# Patient Record
Sex: FEMALE | Race: BLACK OR AFRICAN AMERICAN | NOT HISPANIC OR LATINO | URBAN - METROPOLITAN AREA
[De-identification: names, ages, dates, MRNs, and addresses within clinical notes are randomized per-mention and may not be internally consistent; named-entity substitution may affect disease eponyms.]

---

## 2017-05-31 ENCOUNTER — LAB VISIT (OUTPATIENT)
Dept: LAB | Facility: HOSPITAL | Age: 37
End: 2017-05-31
Attending: FAMILY MEDICINE
Payer: COMMERCIAL

## 2017-05-31 ENCOUNTER — OFFICE VISIT (OUTPATIENT)
Dept: FAMILY MEDICINE | Facility: CLINIC | Age: 37
End: 2017-05-31
Attending: FAMILY MEDICINE
Payer: COMMERCIAL

## 2017-05-31 VITALS
BODY MASS INDEX: 23.19 KG/M2 | HEIGHT: 60 IN | DIASTOLIC BLOOD PRESSURE: 68 MMHG | RESPIRATION RATE: 16 BRPM | WEIGHT: 118.13 LBS | SYSTOLIC BLOOD PRESSURE: 110 MMHG

## 2017-05-31 DIAGNOSIS — K21.9 GASTROESOPHAGEAL REFLUX DISEASE, ESOPHAGITIS PRESENCE NOT SPECIFIED: ICD-10-CM

## 2017-05-31 DIAGNOSIS — Z00.00 ANNUAL PHYSICAL EXAM: Primary | ICD-10-CM

## 2017-05-31 DIAGNOSIS — Z00.00 ANNUAL PHYSICAL EXAM: ICD-10-CM

## 2017-05-31 LAB
ALBUMIN SERPL BCP-MCNC: 3.6 G/DL
ALP SERPL-CCNC: 71 U/L
ALT SERPL W/O P-5'-P-CCNC: 7 U/L
ANION GAP SERPL CALC-SCNC: 6 MMOL/L
AST SERPL-CCNC: 16 U/L
BASOPHILS # BLD AUTO: 0.01 K/UL
BASOPHILS NFR BLD: 0.2 %
BILIRUB SERPL-MCNC: 0.2 MG/DL
BILIRUB UR QL STRIP: NEGATIVE
BUN SERPL-MCNC: 14 MG/DL
CALCIUM SERPL-MCNC: 9.1 MG/DL
CHLORIDE SERPL-SCNC: 104 MMOL/L
CLARITY UR REFRACT.AUTO: ABNORMAL
CO2 SERPL-SCNC: 29 MMOL/L
COLOR UR AUTO: YELLOW
CREAT SERPL-MCNC: 1.1 MG/DL
DIFFERENTIAL METHOD: NORMAL
EOSINOPHIL # BLD AUTO: 0.1 K/UL
EOSINOPHIL NFR BLD: 2.3 %
ERYTHROCYTE [DISTWIDTH] IN BLOOD BY AUTOMATED COUNT: 13.1 %
EST. GFR  (AFRICAN AMERICAN): >60 ML/MIN/1.73 M^2
EST. GFR  (NON AFRICAN AMERICAN): >60 ML/MIN/1.73 M^2
GLUCOSE SERPL-MCNC: 93 MG/DL
GLUCOSE UR QL STRIP: NEGATIVE
HCT VFR BLD AUTO: 39.5 %
HGB BLD-MCNC: 13.4 G/DL
HGB UR QL STRIP: NEGATIVE
KETONES UR QL STRIP: ABNORMAL
LEUKOCYTE ESTERASE UR QL STRIP: NEGATIVE
LYMPHOCYTES # BLD AUTO: 1.5 K/UL
LYMPHOCYTES NFR BLD: 25.4 %
MCH RBC QN AUTO: 30 PG
MCHC RBC AUTO-ENTMCNC: 33.9 %
MCV RBC AUTO: 89 FL
MONOCYTES # BLD AUTO: 0.4 K/UL
MONOCYTES NFR BLD: 6.8 %
NEUTROPHILS # BLD AUTO: 3.7 K/UL
NEUTROPHILS NFR BLD: 65.1 %
NITRITE UR QL STRIP: NEGATIVE
PH UR STRIP: 5 [PH] (ref 5–8)
PLATELET # BLD AUTO: 327 K/UL
PMV BLD AUTO: 10.1 FL
POTASSIUM SERPL-SCNC: 4.5 MMOL/L
PROT SERPL-MCNC: 7.7 G/DL
PROT UR QL STRIP: NEGATIVE
RBC # BLD AUTO: 4.46 M/UL
SODIUM SERPL-SCNC: 139 MMOL/L
SP GR UR STRIP: >=1.03 (ref 1–1.03)
T4 FREE SERPL-MCNC: 0.92 NG/DL
TSH SERPL DL<=0.005 MIU/L-ACNC: 0.38 UIU/ML
URN SPEC COLLECT METH UR: ABNORMAL
UROBILINOGEN UR STRIP-ACNC: 2 EU/DL
WBC # BLD AUTO: 5.7 K/UL

## 2017-05-31 PROCEDURE — 84443 ASSAY THYROID STIM HORMONE: CPT

## 2017-05-31 PROCEDURE — 84439 ASSAY OF FREE THYROXINE: CPT

## 2017-05-31 PROCEDURE — 81003 URINALYSIS AUTO W/O SCOPE: CPT

## 2017-05-31 PROCEDURE — 99385 PREV VISIT NEW AGE 18-39: CPT | Mod: S$GLB,,, | Performed by: FAMILY MEDICINE

## 2017-05-31 PROCEDURE — 85025 COMPLETE CBC W/AUTO DIFF WBC: CPT

## 2017-05-31 PROCEDURE — 80053 COMPREHEN METABOLIC PANEL: CPT

## 2017-05-31 PROCEDURE — 99999 PR PBB SHADOW E&M-NEW PATIENT-LVL II: CPT | Mod: PBBFAC,,, | Performed by: FAMILY MEDICINE

## 2017-05-31 PROCEDURE — 36415 COLL VENOUS BLD VENIPUNCTURE: CPT | Mod: PO

## 2017-05-31 RX ORDER — OMEPRAZOLE 40 MG/1
40 CAPSULE, DELAYED RELEASE ORAL DAILY
Qty: 30 CAPSULE | Refills: 0 | COMMUNITY
Start: 2017-05-31 | End: 2018-04-04 | Stop reason: SDUPTHER

## 2017-05-31 NOTE — PATIENT INSTRUCTIONS
Your test results will be communicated to you via : [facility], Telephone or Letter.   If you have not received your test results in one week, please contact the clinic at [phone].

## 2017-06-07 NOTE — PROGRESS NOTES
Subjective:       Patient ID: Zoraida Mendoza is a 36 y.o. female.    Chief Complaint: Annual Exam    HPI   Pt is here for annual exam pt is well no sob/cp pt has gerd with intermittent heart burn epigastric pan pt takes nothing on a schedule for this   Review of Systems   Constitutional: Negative for activity change, chills, diaphoresis, fatigue and fever.   HENT: Negative for congestion, ear discharge, ear pain, hearing loss, postnasal drip, rhinorrhea, sinus pressure, sneezing, sore throat, trouble swallowing and voice change.    Eyes: Negative for photophobia, discharge, redness, itching and visual disturbance.   Respiratory: Negative for cough, chest tightness, shortness of breath and wheezing.    Cardiovascular: Negative for chest pain, palpitations and leg swelling.   Gastrointestinal: Negative for abdominal pain, anal bleeding, blood in stool, constipation, diarrhea, nausea, rectal pain and vomiting.   Genitourinary: Negative for dyspareunia, dysuria, flank pain, frequency, hematuria, menstrual problem, pelvic pain, urgency, vaginal bleeding and vaginal discharge.   Musculoskeletal: Negative for arthralgias, back pain, joint swelling and neck pain.   Skin: Negative for color change and rash.   Neurological: Negative for dizziness, speech difficulty, weakness, light-headedness, numbness and headaches.   Hematological: Does not bruise/bleed easily.   Psychiatric/Behavioral: Negative for agitation, confusion, decreased concentration, sleep disturbance and suicidal ideas. The patient is not nervous/anxious.        Objective:      Physical Exam   Constitutional: She appears well-developed and well-nourished.   HENT:   Head: Normocephalic and atraumatic.   Right Ear: External ear normal.   Left Ear: External ear normal.   Nose: Nose normal.   Mouth/Throat: Oropharynx is clear and moist.   Eyes: Conjunctivae and EOM are normal. Pupils are equal, round, and reactive to light. Right eye exhibits no discharge. Left  "eye exhibits no discharge.   Neck: Normal range of motion. Neck supple. No thyromegaly present.   Cardiovascular: Normal rate, regular rhythm, normal heart sounds and intact distal pulses.  Exam reveals no gallop and no friction rub.    No murmur heard.  Pulmonary/Chest: Effort normal and breath sounds normal. She has no wheezes. She has no rales.   Abdominal: Soft. Bowel sounds are normal. She exhibits no distension. There is no tenderness. There is no rebound and no guarding.   Genitourinary:   Genitourinary Comments: declines   Musculoskeletal: Normal range of motion. She exhibits no edema or tenderness.   Lymphadenopathy:     She has no cervical adenopathy.   Neurological: She is alert. No cranial nerve deficit. She exhibits normal muscle tone. Coordination normal.   Skin: Skin is warm and dry. No rash noted. No erythema.   Psychiatric: She has a normal mood and affect. Her behavior is normal. Judgment and thought content normal.       Assessment:       1. Annual physical exam    2. Gastroesophageal reflux disease, esophagitis presence not specified        Plan:     orders cbc cmp lipid tsh urine  Start ppi daily  Avoid late meals and triggers    rtc 1 month reevaluation    Health maintenance  Pap with gyn  Breast exam with pap  Lipid ordered  Flu shot in fall  Tetanus q 10 years         "This note will not be shared with the patient."   "

## 2018-03-13 ENCOUNTER — OFFICE VISIT (OUTPATIENT)
Dept: URGENT CARE | Facility: CLINIC | Age: 38
End: 2018-03-13
Payer: COMMERCIAL

## 2018-03-13 VITALS
WEIGHT: 118 LBS | HEIGHT: 60 IN | OXYGEN SATURATION: 100 % | BODY MASS INDEX: 23.16 KG/M2 | RESPIRATION RATE: 19 BRPM | HEART RATE: 80 BPM | DIASTOLIC BLOOD PRESSURE: 85 MMHG | SYSTOLIC BLOOD PRESSURE: 145 MMHG | TEMPERATURE: 99 F

## 2018-03-13 DIAGNOSIS — J02.9 SORE THROAT: ICD-10-CM

## 2018-03-13 DIAGNOSIS — J20.9 ACUTE BRONCHITIS, UNSPECIFIED ORGANISM: Primary | ICD-10-CM

## 2018-03-13 LAB
CTP QC/QA: YES
CTP QC/QA: YES
FLUAV AG NPH QL: NEGATIVE
FLUBV AG NPH QL: NEGATIVE
S PYO RRNA THROAT QL PROBE: NEGATIVE

## 2018-03-13 PROCEDURE — 87804 INFLUENZA ASSAY W/OPTIC: CPT | Mod: QW,S$GLB,, | Performed by: FAMILY MEDICINE

## 2018-03-13 PROCEDURE — 99214 OFFICE O/P EST MOD 30 MIN: CPT | Mod: S$GLB,,, | Performed by: FAMILY MEDICINE

## 2018-03-13 PROCEDURE — 87880 STREP A ASSAY W/OPTIC: CPT | Mod: QW,S$GLB,, | Performed by: FAMILY MEDICINE

## 2018-03-13 RX ORDER — BENZONATATE 100 MG/1
100 CAPSULE ORAL EVERY 6 HOURS PRN
Qty: 30 CAPSULE | Refills: 1 | Status: SHIPPED | OUTPATIENT
Start: 2018-03-13 | End: 2018-04-10

## 2018-03-13 RX ORDER — AZITHROMYCIN 250 MG/1
TABLET, FILM COATED ORAL
Qty: 6 TABLET | Refills: 0 | Status: SHIPPED | OUTPATIENT
Start: 2018-03-13 | End: 2018-03-18

## 2018-03-13 NOTE — PATIENT INSTRUCTIONS
Acute Bronchitis  Your healthcare provider has told you that you have acute bronchitis. Bronchitis is infection or inflammation of the bronchial tubes (airways in the lungs). Normally, air moves easily in and out of the airways. Bronchitis narrows the airways, making it harder for air to flow in and out of the lungs. This causes symptoms such as shortness of breath, coughing up yellow or green mucus, and wheezing. Bronchitis can be acute or chronic. Acute means the condition comes on quickly and goes away in a short time, usually within 3 to 10 days. Chronic means a condition lasts a long time and often comes back.    What causes acute bronchitis?  Acute bronchitis almost always starts as a viral respiratory infection, such as a cold or the flu. Certain factors make it more likely for a cold or flu to turn into bronchitis. These include being very young, being elderly, having a heart or lung problem, or having a weak immune system. Cigarette smoking also makes bronchitis more likely.  When bronchitis develops, the airways become swollen. The airways may also become infected with bacteria. This is known as a secondary infection.  Diagnosing acute bronchitis  Your healthcare provider will examine you and ask about your symptoms and health history. You may also have a sputum culture to test the fluid in your lungs. Chest X-rays may be done to look for infection in the lungs.  Treating acute bronchitis  Bronchitis usually clears up as the cold or flu goes away. You can help feel better faster by doing the following:  · Take medicine as directed. You may be told to take ibuprofen or other over-the-counter medicines. These help relieve inflammation in your bronchial tubes. Your healthcare provider may prescribe an inhaler to help open up the bronchial tubes. Most of the time, acute bronchitis is caused by a viral infection. Antibiotics are usually not prescribed for viral infections.  · Drink plenty of fluids, such as  water, juice, or warm soup. Fluids loosen mucus so that you can cough it up. This helps you breathe more easily. Fluids also prevent dehydration.  · Make sure you get plenty of rest.  · Do not smoke. Do not allow anyone else to smoke in your home.  Recovery and follow-up  Follow up with your doctor as you are told. You will likely feel better in a week or two. But a dry cough can linger beyond that time. Let your doctor know if you still have symptoms (other than a dry cough) after 2 weeks, or if youre prone to getting bronchial infections. Take steps to protect yourself from future infections. These steps include stopping smoking and avoiding tobacco smoke, washing your hands often, and getting a yearly flu shot.  When to call your healthcare provider  Call the healthcare provider if you have any of the following:  · Fever of 100.4°F (38.0°C) or higher, or as advised  · Symptoms that get worse, or new symptoms  · Trouble breathing  · Symptoms that dont start to improve within a week, or within 3 days of taking antibiotics   Date Last Reviewed: 12/1/2016  © 6033-2043 [employer]. [address] [place] [address], [place] [address]. All rights reserved. This information is not intended as a substitute for professional medical care. Always follow your healthcare professional's instructions.

## 2018-03-13 NOTE — PROGRESS NOTES
Subjective:       Patient ID: Zoraida Mendoza is a 37 y.o. female.    Vitals:  height is 5' (1.524 m) and weight is 53.5 kg (118 lb). Her oral temperature is 99 °F (37.2 °C). Her blood pressure is 145/85 (abnormal) and her pulse is 80. Her respiration is 19 and oxygen saturation is 100%.     Chief Complaint: Cough and Sore Throat    Cough   This is a new problem. The current episode started 1 to 4 weeks ago (2 weeks ago ). The problem has been unchanged. The problem occurs constantly. The cough is productive of sputum. Associated symptoms include chills, headaches, myalgias and a sore throat. Pertinent negatives include no chest pain, ear pain, eye redness, fever, shortness of breath or wheezing. Nothing aggravates the symptoms. She has tried OTC cough suppressant and prescription cough suppressant for the symptoms. The treatment provided no relief. Her past medical history is significant for asthma and bronchitis. There is no history of emphysema or pneumonia.   Sore Throat    This is a new problem. Episode onset: saturday. The problem has been unchanged. There has been no fever. The fever has been present for less than 1 day. The pain is at a severity of 5/10. The pain is moderate. Associated symptoms include congestion, coughing, headaches, a hoarse voice, a plugged ear sensation and trouble swallowing. Pertinent negatives include no abdominal pain, ear pain or shortness of breath. She has had no exposure to strep or mono. She has tried acetaminophen for the symptoms. The treatment provided no relief.     Review of Systems   Constitution: Positive for chills and malaise/fatigue. Negative for fever.   HENT: Positive for congestion, hoarse voice, sore throat and trouble swallowing. Negative for ear pain.    Eyes: Negative for discharge and redness.   Cardiovascular: Negative for chest pain, dyspnea on exertion and leg swelling.   Respiratory: Positive for cough and sputum production. Negative for shortness of  breath and wheezing.    Musculoskeletal: Positive for myalgias.   Gastrointestinal: Negative for abdominal pain and nausea.   Neurological: Positive for headaches.       Objective:      Physical Exam   Constitutional: She appears well-developed and well-nourished.   HENT:   Head: Normocephalic and atraumatic.   Mouth/Throat: Oropharynx is clear and moist. No oropharyngeal exudate.   Eyes: EOM are normal. Pupils are equal, round, and reactive to light.   Neck: Normal range of motion. Neck supple.   Cardiovascular: Normal rate, regular rhythm and normal heart sounds.    Pulmonary/Chest: Effort normal. She has wheezes (scant wheezes, + rhonchi).   Nursing note and vitals reviewed.      Assessment:       1. Acute bronchitis, unspecified organism    2. Sore throat        Plan:         Acute bronchitis, unspecified organism  -     POCT Influenza A/B  -     azithromycin (Z-NOVA) 250 MG tablet; Take 2 tablets by mouth on day 1; Take 1 tablet by mouth on days 2-5  Dispense: 6 tablet; Refill: 0  -     benzonatate (TESSALON PERLES) 100 MG capsule; Take 1 capsule (100 mg total) by mouth every 6 (six) hours as needed for Cough.  Dispense: 30 capsule; Refill: 1    Sore throat  -     POCT rapid strep A

## 2018-04-04 ENCOUNTER — OFFICE VISIT (OUTPATIENT)
Dept: URGENT CARE | Facility: CLINIC | Age: 38
End: 2018-04-04
Payer: COMMERCIAL

## 2018-04-04 VITALS
HEART RATE: 82 BPM | OXYGEN SATURATION: 98 % | HEIGHT: 60 IN | WEIGHT: 118 LBS | SYSTOLIC BLOOD PRESSURE: 132 MMHG | DIASTOLIC BLOOD PRESSURE: 96 MMHG | RESPIRATION RATE: 18 BRPM | TEMPERATURE: 99 F | BODY MASS INDEX: 23.16 KG/M2

## 2018-04-04 DIAGNOSIS — J20.9 ACUTE BRONCHITIS, UNSPECIFIED ORGANISM: Primary | ICD-10-CM

## 2018-04-04 DIAGNOSIS — Z98.84 HX OF LAPAROSCOPIC GASTRIC BANDING: ICD-10-CM

## 2018-04-04 DIAGNOSIS — K21.9 GASTROESOPHAGEAL REFLUX DISEASE, ESOPHAGITIS PRESENCE NOT SPECIFIED: ICD-10-CM

## 2018-04-04 PROCEDURE — 99214 OFFICE O/P EST MOD 30 MIN: CPT | Mod: S$GLB,,, | Performed by: NURSE PRACTITIONER

## 2018-04-04 RX ORDER — OMEPRAZOLE 40 MG/1
40 CAPSULE, DELAYED RELEASE ORAL DAILY
Qty: 30 CAPSULE | Refills: 1 | COMMUNITY
Start: 2018-04-04 | End: 2018-05-04

## 2018-04-04 RX ORDER — PROMETHAZINE HYDROCHLORIDE AND CODEINE PHOSPHATE 6.25; 1 MG/5ML; MG/5ML
5 SOLUTION ORAL NIGHTLY PRN
Qty: 120 ML | Refills: 0 | Status: SHIPPED | OUTPATIENT
Start: 2018-04-04 | End: 2018-04-14

## 2018-04-04 RX ORDER — PREDNISONE 20 MG/1
40 TABLET ORAL DAILY
Qty: 10 TABLET | Refills: 0 | Status: SHIPPED | OUTPATIENT
Start: 2018-04-04 | End: 2018-04-10

## 2018-04-04 RX ORDER — ALBUTEROL SULFATE 90 UG/1
1-2 AEROSOL, METERED RESPIRATORY (INHALATION) EVERY 6 HOURS PRN
Qty: 18 G | Refills: 0 | Status: SHIPPED | OUTPATIENT
Start: 2018-04-04 | End: 2019-04-04

## 2018-04-04 NOTE — PATIENT INSTRUCTIONS
Cough syrup may cause sedation.   Prilosec refilled.   Follow up with bariatric surgery. Referral has been placed. Call [phone] to schedule an appointment.     Please drink plenty of fluids.  Please get plenty of rest.  Please return here or go to the Emergency Department for any concerns or worsening of condition.  If you were prescribed antibiotics, please take them to completion.  If you were given wait & see antibiotics, please wait 5-7 days before taking them, and only take them if your symptoms have worsened or not improved.  If you do begin taking the antibiotics, please take them to completion.  If you were prescribed a narcotic medication, do not drive or operate heavy equipment or machinery while taking these medications.  If you were given a steroid shot in the clinic and have also been given a prescription for a steroid such as Prednisone or a Medrol Dose Pack, please begin taking them tomorrow.  If you do not have Hypertension or any history of palpitations, it is ok to take over the counter Sudafed or Mucinex D or Allegra-D or Claritin-D or Zyrtec-D.  If you do take one of the above, it is ok to combine that with plain over the counter Mucinex or Allegra or Claritin or Zyrtec.  If for example you are taking Zyrtec -D, you can combine that with Mucinex, but not Mucinex-D.  If you are taking Mucinex-D, you can combine that with plain Allegra or Claritin or Zyrtec.   If you do have Hypertension or palpitations, it is safe to take Coricidin HBP for relief of sinus symptoms.  If not allergic, please take over the counter Tylenol (Acetaminophen) and/or Motrin (Ibuprofen) as directed for control of pain and/or fever.  Please follow up with your primary care doctor or specialist as needed.    If you  smoke, please stop smoking.        Acute Bronchitis  Your healthcare provider has told you that you have acute bronchitis. Bronchitis is infection or inflammation of the bronchial tubes (airways in the  lungs). Normally, air moves easily in and out of the airways. Bronchitis narrows the airways, making it harder for air to flow in and out of the lungs. This causes symptoms such as shortness of breath, coughing up yellow or green mucus, and wheezing. Bronchitis can be acute or chronic. Acute means the condition comes on quickly and goes away in a short time, usually within 3 to 10 days. Chronic means a condition lasts a long time and often comes back.    What causes acute bronchitis?  Acute bronchitis almost always starts as a viral respiratory infection, such as a cold or the flu. Certain factors make it more likely for a cold or flu to turn into bronchitis. These include being very young, being elderly, having a heart or lung problem, or having a weak immune system. Cigarette smoking also makes bronchitis more likely.  When bronchitis develops, the airways become swollen. The airways may also become infected with bacteria. This is known as a secondary infection.  Diagnosing acute bronchitis  Your healthcare provider will examine you and ask about your symptoms and health history. You may also have a sputum culture to test the fluid in your lungs. Chest X-rays may be done to look for infection in the lungs.  Treating acute bronchitis  Bronchitis usually clears up as the cold or flu goes away. You can help feel better faster by doing the following:  · Take medicine as directed. You may be told to take ibuprofen or other over-the-counter medicines. These help relieve inflammation in your bronchial tubes. Your healthcare provider may prescribe an inhaler to help open up the bronchial tubes. Most of the time, acute bronchitis is caused by a viral infection. Antibiotics are usually not prescribed for viral infections.  · Drink plenty of fluids, such as water, juice, or warm soup. Fluids loosen mucus so that you can cough it up. This helps you breathe more easily. Fluids also prevent dehydration.  · Make sure you get  plenty of rest.  · Do not smoke. Do not allow anyone else to smoke in your home.  Recovery and follow-up  Follow up with your doctor as you are told. You will likely feel better in a week or two. But a dry cough can linger beyond that time. Let your doctor know if you still have symptoms (other than a dry cough) after 2 weeks, or if youre prone to getting bronchial infections. Take steps to protect yourself from future infections. These steps include stopping smoking and avoiding tobacco smoke, washing your hands often, and getting a yearly flu shot.  When to call your healthcare provider  Call the healthcare provider if you have any of the following:  · Fever of 100.4°F (38.0°C) or higher, or as advised  · Symptoms that get worse, or new symptoms  · Trouble breathing  · Symptoms that dont start to improve within a week, or within 3 days of taking antibiotics   Date Last Reviewed: 12/1/2016  © 0496-3068 [employer]. [address] [place] [address], [place] [address]. All rights reserved. This information is not intended as a substitute for professional medical care. Always follow your healthcare professional's instructions.        Tips to Control Acid Reflux    To control acid reflux, youll need to make some basic diet and lifestyle changes. The simple steps outlined below may be all youll need to ease discomfort.  Watch what you eat  · Avoid fatty foods and spicy foods.  · Eat fewer acidic foods, such as citrus and tomato-based foods. These can increase symptoms.  · Limit drinking alcohol, caffeine, and fizzy beverages. All increase acid reflux.  · Try limiting chocolate, peppermint, and spearmint. These can worsen acid reflux in some people.  Watch when you eat  · Avoid lying down for 3 hours after eating.  · Do not snack before going to bed.  Raise your head  Raising your head and upper body by 4 to 6 inches helps limit reflux when youre lying down. Put blocks under the head of your bed frame to raise  it.  Other changes  · Lose weight, if you need to  · Dont exercise near bedtime  · Avoid tight-fitting clothes  · Limit aspirin and ibuprofen  · Stop smoking   Date Last Reviewed: 7/1/2016  © 0141-9882 [employer]. [address] [place] [address], [place] [address]. All rights reserved. This information is not intended as a substitute for professional medical care. Always follow your healthcare professional's instructions.

## 2018-04-04 NOTE — PROGRESS NOTES
Subjective:       Patient ID: Zoraida Mendoza is a 37 y.o. female.    Vitals:  height is 5' (1.524 m) and weight is 53.5 kg (118 lb). Her oral temperature is 98.6 °F (37 °C). Her blood pressure is 132/96 (abnormal) and her pulse is 82. Her respiration is 18 and oxygen saturation is 98%.     Chief Complaint: Cough    She was seen in urgent care on 3/13/18- dx with bronchitis and given a z-nikko. She reports that she completed the z-nikko but continues to cough. + productive cough with green sputum. Denies SOB, CP, fever, or chills. Reports the cough is keeping her up all night. Sometimes the coughing spells will lead to vomiting.   She also suffers from GERD and has been out of her PPI.       Cough   This is a recurrent problem. The current episode started in the past 7 days. The problem has been gradually worsening. The problem occurs every few minutes. The cough is productive of sputum. Pertinent negatives include no chest pain, chills, ear pain, eye redness, fever, headaches, myalgias, sore throat, shortness of breath or wheezing. She has tried prescription cough suppressant for the symptoms. The treatment provided no relief.     Review of Systems   Constitution: Positive for malaise/fatigue. Negative for chills and fever.   HENT: Negative for congestion, ear pain, hoarse voice and sore throat.    Eyes: Negative for discharge and redness.   Cardiovascular: Negative for chest pain, dyspnea on exertion and leg swelling.   Respiratory: Positive for cough and sputum production. Negative for shortness of breath and wheezing.    Musculoskeletal: Negative for myalgias.   Gastrointestinal: Negative for abdominal pain and nausea.   Neurological: Negative for headaches.       Objective:      Physical Exam   Constitutional: She is oriented to person, place, and time. She appears well-developed and well-nourished. She is cooperative.  Non-toxic appearance. She does not appear ill. No distress.   Nonproductive cough during  exam.    HENT:   Head: Normocephalic and atraumatic.   Right Ear: Hearing, tympanic membrane, external ear and ear canal normal.   Left Ear: Hearing, tympanic membrane, external ear and ear canal normal.   Nose: Mucosal edema (boggy turbinates ) and rhinorrhea present. No nasal deformity. No epistaxis. Right sinus exhibits no maxillary sinus tenderness and no frontal sinus tenderness. Left sinus exhibits no maxillary sinus tenderness and no frontal sinus tenderness.   Mouth/Throat: Uvula is midline, oropharynx is clear and moist and mucous membranes are normal. No trismus in the jaw. Normal dentition. No uvula swelling. No oropharyngeal exudate, posterior oropharyngeal edema or posterior oropharyngeal erythema.   Eyes: Conjunctivae, EOM and lids are normal. Pupils are equal, round, and reactive to light. No scleral icterus.   Sclera clear bilat   Neck: Trachea normal, normal range of motion, full passive range of motion without pain and phonation normal. Neck supple.   Cardiovascular: Normal rate, regular rhythm, normal heart sounds, intact distal pulses and normal pulses.    Pulmonary/Chest: Effort normal. No accessory muscle usage. No tachypnea. No respiratory distress. She has decreased breath sounds in the right lower field. She has no wheezes. She has no rhonchi. She has no rales.   Abdominal: Soft. Normal appearance and bowel sounds are normal. She exhibits no distension. There is no tenderness.   Musculoskeletal: Normal range of motion. She exhibits no edema or deformity.   Neurological: She is alert and oriented to person, place, and time. She exhibits normal muscle tone. Coordination normal.   Skin: Skin is warm, dry and intact. She is not diaphoretic. No pallor.   Psychiatric: She has a normal mood and affect. Her speech is normal and behavior is normal. Judgment and thought content normal. Cognition and memory are normal.   Nursing note and vitals reviewed.        Reading Physician Reading Date Result  Priority   Janes Solis MD 4/4/2018    Narrative     EXAMINATION:  XR CHEST PA AND LATERAL    CLINICAL HISTORY:  Cough    TECHNIQUE:  PA and lateral views of the chest were performed.    COMPARISON:  None    FINDINGS:  Cardiac silhouette and pulmonary vascularity are within normal range.  Lungs show no evidence of significant focal consolidation, large effusion, or pneumothorax.  There is a gastric band present.  There is a distended tubular structure over the mediastinum which can be seen projected over posterior mediastinum on the lateral view.  Findings are concerning for a distended thoracic esophagus given the present of the gastric band.  Differential would include a large hiatal hernia if the band has slipped.  Bones demonstrate no acute abnormality.      Impression       Findings concerning for markedly distended esophagus in the setting of a gastric band differential would include a hiatal hernia if the band has slipped..  Correlate with patient's symptoms.  Further evaluation could be obtained with either chest CT or upper GI.       Electronically signed by: Janes Solis MD  Date: 04/04/2018  Time: 18:44       Assessment:       1. Acute bronchitis, unspecified organism    2. Gastroesophageal reflux disease, esophagitis presence not specified    3. Hx of laparoscopic gastric banding        Plan:         Acute bronchitis, unspecified organism  -     X-Ray Chest PA And Lateral; Future; Expected date: 04/04/2018  -     albuterol 90 mcg/actuation inhaler; Inhale 1-2 puffs into the lungs every 6 (six) hours as needed for Wheezing. Rescue  Dispense: 18 g; Refill: 0  -     promethazine-codeine 6.25-10 mg/5 ml (PHENERGAN WITH CODEINE) 6.25-10 mg/5 mL syrup; Take 5 mLs by mouth nightly as needed for Cough.  Dispense: 120 mL; Refill: 0  -     predniSONE (DELTASONE) 20 MG tablet; Take 2 tablets (40 mg total) by mouth once daily.  Dispense: 10 tablet; Refill: 0    Gastroesophageal reflux disease, esophagitis  presence not specified  -     omeprazole (PRILOSEC) 40 MG capsule; Take 1 capsule (40 mg total) by mouth once daily.  Dispense: 30 capsule; Refill: 1    Hx of laparoscopic gastric banding  -     Ambulatory Referral to Bariatric Surgery ([facility])       CXR negative for pneumonia.   + findings regarding lap band. patient reports a hx of lap band surgery in [place]. She lives here now. Referral placed to follow up with bariatric surgery. patient given a copy of her Xray reports and discussed the radiologist recommendations for Chest CT or Upper GI scope. She verbalized understanding regarding the importance of follow up.     Patient Instructions     Cough syrup may cause sedation.   Prilosec refilled.   Follow up with bariatric surgery. Referral has been placed. Call [phone] to schedule an appointment.     Please drink plenty of fluids.  Please get plenty of rest.  Please return here or go to the Emergency Department for any concerns or worsening of condition.  If you were prescribed antibiotics, please take them to completion.  If you were given wait & see antibiotics, please wait 5-7 days before taking them, and only take them if your symptoms have worsened or not improved.  If you do begin taking the antibiotics, please take them to completion.  If you were prescribed a narcotic medication, do not drive or operate heavy equipment or machinery while taking these medications.  If you were given a steroid shot in the clinic and have also been given a prescription for a steroid such as Prednisone or a Medrol Dose Pack, please begin taking them tomorrow.  If you do not have Hypertension or any history of palpitations, it is ok to take over the counter Sudafed or Mucinex D or Allegra-D or Claritin-D or Zyrtec-D.  If you do take one of the above, it is ok to combine that with plain over the counter Mucinex or Allegra or Claritin or Zyrtec.  If for example you are taking Zyrtec -D, you can combine that with Mucinex,  but not Mucinex-D.  If you are taking Mucinex-D, you can combine that with plain Allegra or Claritin or Zyrtec.   If you do have Hypertension or palpitations, it is safe to take Coricidin HBP for relief of sinus symptoms.  If not allergic, please take over the counter Tylenol (Acetaminophen) and/or Motrin (Ibuprofen) as directed for control of pain and/or fever.  Please follow up with your primary care doctor or specialist as needed.    If you  smoke, please stop smoking.        Acute Bronchitis  Your healthcare provider has told you that you have acute bronchitis. Bronchitis is infection or inflammation of the bronchial tubes (airways in the lungs). Normally, air moves easily in and out of the airways. Bronchitis narrows the airways, making it harder for air to flow in and out of the lungs. This causes symptoms such as shortness of breath, coughing up yellow or green mucus, and wheezing. Bronchitis can be acute or chronic. Acute means the condition comes on quickly and goes away in a short time, usually within 3 to 10 days. Chronic means a condition lasts a long time and often comes back.    What causes acute bronchitis?  Acute bronchitis almost always starts as a viral respiratory infection, such as a cold or the flu. Certain factors make it more likely for a cold or flu to turn into bronchitis. These include being very young, being elderly, having a heart or lung problem, or having a weak immune system. Cigarette smoking also makes bronchitis more likely.  When bronchitis develops, the airways become swollen. The airways may also become infected with bacteria. This is known as a secondary infection.  Diagnosing acute bronchitis  Your healthcare provider will examine you and ask about your symptoms and health history. You may also have a sputum culture to test the fluid in your lungs. Chest X-rays may be done to look for infection in the lungs.  Treating acute bronchitis  Bronchitis usually clears up as the cold  or flu goes away. You can help feel better faster by doing the following:  · Take medicine as directed. You may be told to take ibuprofen or other over-the-counter medicines. These help relieve inflammation in your bronchial tubes. Your healthcare provider may prescribe an inhaler to help open up the bronchial tubes. Most of the time, acute bronchitis is caused by a viral infection. Antibiotics are usually not prescribed for viral infections.  · Drink plenty of fluids, such as water, juice, or warm soup. Fluids loosen mucus so that you can cough it up. This helps you breathe more easily. Fluids also prevent dehydration.  · Make sure you get plenty of rest.  · Do not smoke. Do not allow anyone else to smoke in your home.  Recovery and follow-up  Follow up with your doctor as you are told. You will likely feel better in a week or two. But a dry cough can linger beyond that time. Let your doctor know if you still have symptoms (other than a dry cough) after 2 weeks, or if youre prone to getting bronchial infections. Take steps to protect yourself from future infections. These steps include stopping smoking and avoiding tobacco smoke, washing your hands often, and getting a yearly flu shot.  When to call your healthcare provider  Call the healthcare provider if you have any of the following:  · Fever of 100.4°F (38.0°C) or higher, or as advised  · Symptoms that get worse, or new symptoms  · Trouble breathing  · Symptoms that dont start to improve within a week, or within 3 days of taking antibiotics   Date Last Reviewed: 12/1/2016  © 7542-4831 [employer]. [address] [place] [address], [place] [address]. All rights reserved. This information is not intended as a substitute for professional medical care. Always follow your healthcare professional's instructions.        Tips to Control Acid Reflux    To control acid reflux, youll need to make some basic diet and lifestyle changes. The simple steps outlined  below may be all youll need to ease discomfort.  Watch what you eat  · Avoid fatty foods and spicy foods.  · Eat fewer acidic foods, such as citrus and tomato-based foods. These can increase symptoms.  · Limit drinking alcohol, caffeine, and fizzy beverages. All increase acid reflux.  · Try limiting chocolate, peppermint, and spearmint. These can worsen acid reflux in some people.  Watch when you eat  · Avoid lying down for 3 hours after eating.  · Do not snack before going to bed.  Raise your head  Raising your head and upper body by 4 to 6 inches helps limit reflux when youre lying down. Put blocks under the head of your bed frame to raise it.  Other changes  · Lose weight, if you need to  · Dont exercise near bedtime  · Avoid tight-fitting clothes  · Limit aspirin and ibuprofen  · Stop smoking   Date Last Reviewed: 7/1/2016  © 7280-6376 [employer], [employer]. [address] [place] [address], [place] [address]. All rights reserved. This information is not intended as a substitute for professional medical care. Always follow your healthcare professional's instructions.

## 2018-04-10 ENCOUNTER — OFFICE VISIT (OUTPATIENT)
Dept: OBSTETRICS AND GYNECOLOGY | Facility: CLINIC | Age: 38
End: 2018-04-10
Payer: COMMERCIAL

## 2018-04-10 VITALS
WEIGHT: 113.56 LBS | DIASTOLIC BLOOD PRESSURE: 66 MMHG | HEIGHT: 60 IN | SYSTOLIC BLOOD PRESSURE: 108 MMHG | BODY MASS INDEX: 22.29 KG/M2

## 2018-04-10 DIAGNOSIS — Z01.419 WELL WOMAN EXAM WITH ROUTINE GYNECOLOGICAL EXAM: ICD-10-CM

## 2018-04-10 DIAGNOSIS — Z12.4 ROUTINE PAPANICOLAOU SMEAR: Primary | ICD-10-CM

## 2018-04-10 PROCEDURE — 99999 PR PBB SHADOW E&M-EST. PATIENT-LVL II: CPT | Mod: PBBFAC,,, | Performed by: OBSTETRICS & GYNECOLOGY

## 2018-04-10 PROCEDURE — 99385 PREV VISIT NEW AGE 18-39: CPT | Mod: S$GLB,,, | Performed by: OBSTETRICS & GYNECOLOGY

## 2018-04-10 PROCEDURE — 88142 CYTOPATH C/V THIN LAYER: CPT

## 2018-04-10 NOTE — PROGRESS NOTES
HPI:  37 y.o.   OB History      Para Term  AB Living    0 0 0 0 0 0    SAB TAB Ectopic Multiple Live Births    0 0 0 0 0       No LMP recorded. Patient has had a hysterectomy.   Patient here for her annual gynecologic exam.  She has no complaints at this time.    ROS:  GENERAL: No fever, chills, fatigability or weight loss.  SKIN: No rashes, itching or changes in color or texture of skin.  HEAD: No headaches or recent head trauma.  EYES: Visual acuity fine. No photophobia, ocular pain or diplopia.  EARS: Denies ear pain, discharge or vertigo.  NOSE: No loss of smell, no epistaxis or postnasal drip.  MOUTH & THROAT: No hoarseness or change in voice. No excessive gum bleeding.  NODES: Denies swollen glands.  CHEST: Denies BOYD, cyanosis, wheezing, cough and sputum production.  CARDIOVASCULAR: Denies chest pain, PND, orthopnea or reduced exercise tolerance.  ABDOMEN: Appetite fine. No weight loss. Denies diarrhea, abdominal pain, hematemesis or blood in stool.  URINARY: No flank pain, dysuria or hematuria.  PERIPHERAL VASCULAR: No claudication or cyanosis.  MUSCULOSKELETAL: No joint stiffness or swelling. Denies back pain.  NEUROLOGIC: No history of seizures, paralysis, alteration of gait or coordination.    PE:   /66   Ht 5' (1.524 m)   Wt 51.5 kg (113 lb 8.6 oz)   BMI 22.17 kg/m²   APPEARANCE: Well nourished, well developed, in no acute distress.  NECK: Neck symmetric without masses or thyromegaly.  NODES: No inguinal lymph node enlargement.  ABDOMEN: Soft. No tenderness or masses. No hepatosplenomegaly. No hernias.  BREASTS: Symmetrical, no skin changes or visible lesions. No palpable masses, nipple discharge or adenopathy bilaterally.  PELVIC: Normal external female genitalia without lesions. Normal hair distribution. Adequate perineal body, normal urethral meatus. Vagina moist and well rugated without lesions or discharge. No significant cystocele or rectocele. Uterus and cervix surgically  absent. Bimanual exam revealed no masses, tenderness or abnormality.    Procedure:  Pap Smear    Assessment:  Normal Gynecologic Exam    Plan:  Mammogram and Colonoscopy as per current recommendations.   Return to clinic in one year or for any problems or complaints.  One ovary in  Neg gyn hx

## 2018-05-10 ENCOUNTER — OFFICE VISIT (OUTPATIENT)
Dept: URGENT CARE | Facility: CLINIC | Age: 38
End: 2018-05-10
Payer: COMMERCIAL

## 2018-05-10 VITALS
OXYGEN SATURATION: 100 % | HEART RATE: 76 BPM | BODY MASS INDEX: 22.19 KG/M2 | WEIGHT: 113 LBS | TEMPERATURE: 98 F | HEIGHT: 60 IN | SYSTOLIC BLOOD PRESSURE: 138 MMHG | DIASTOLIC BLOOD PRESSURE: 83 MMHG

## 2018-05-10 DIAGNOSIS — B00.2 ORAL HERPES: Primary | ICD-10-CM

## 2018-05-10 PROCEDURE — 3008F BODY MASS INDEX DOCD: CPT | Mod: CPTII,S$GLB,, | Performed by: NURSE PRACTITIONER

## 2018-05-10 PROCEDURE — 99214 OFFICE O/P EST MOD 30 MIN: CPT | Mod: S$GLB,,, | Performed by: NURSE PRACTITIONER

## 2018-05-10 RX ORDER — ACYCLOVIR 400 MG/1
400 TABLET ORAL 3 TIMES DAILY
Qty: 21 TABLET | Refills: 0 | Status: SHIPPED | OUTPATIENT
Start: 2018-05-10 | End: 2018-05-17

## 2018-05-10 NOTE — PATIENT INSTRUCTIONS
Understanding Cold Sores  Cold sores are small blisters or sores on the lip or sometimes inside the mouth. Many people get them from time to time. Cold sores usually are not serious, and they usually heal in a week or two. They are caused by 2 related viruses, herpes simplex type 1 and 2. These viruses spread very easily. Many people have one or both of these viruses in their body. More than 4 in every 5 people are infected with herpes simplex type 1. Once you have the virus that causes cold sores, it stays in your body for the rest of your life. But it can be inactive for long periods.  What causes a cold sore?  Cold sores are usually caused by herpes simplex virus type 1. Less often, they are caused by herpes simplex virus type 2. Herpes simplex virus type 2 is the more common cause of genital sores. The herpes viruses can enter the body through a break in the skin such as a scrape. Or they may enter through mucous membranes such as the lips or mouth. Some ways to get the viruses include:  · Kissing someone who has a cold sore  · Sharing a drinking glass, eating utensils, or lip balm with someone who has a cold sore  · Having oral sex with someone who has a cold sore  A  baby can also get the infection at birth.  If you have a herpes virus, you can to pass it along even when you dont have a sore.  Cold sores flare up occasionally. Things that can cause an outbreak include:  · Sun exposure  · Fever  · Stress or exhaustion  · Menstruation  · Skin irritation  · Another unrelated Illness such as pneumonia, urinary infection, or cancer  What are the symptoms of a cold sore?  Symptoms can include:  · A blister-like sore or cluster of sores. These often occur at the edge of the lips but may appear inside the mouth.  · Skin redness around the sores.  · Pain or itching in the area of the outbreak. Often the pain or itching develops 12 to 24 hours before the sore become visible.  · Flu-like symptoms, including  swollen glands, headache, body ache, or fever. These typically occur only at the time of the first infection.  Cold sores may also occur on fingers. They may rarely infect the eyes, a serious possible complication.  Some people have symptoms a day or two before an outbreak. They may feel tenderness, burning, itching, or tingling before a cold sore appears. Cold sores tend to come back in the same area that they first appeared.  How are cold sores treated?  Treatment for cold sores focuses on relieving and shortening symptoms. For people with frequent outbreaks, treatment works to decrease how often future episodes.  Treatments may include:  · Prescription or over-the-counter pain medicines. These can help with discomfort, especially if sores are inside the mouth.  · Antiviral medicines. These may be pills that are taken by mouth or a cream to apply to sores. They may help shorten an outbreak and reduce the severity of symptoms.  They may be used to help prevent future outbreaks if you have disabling recurrent infections.  · Self-care such as extra rest and drinking more fluids. These may help relieve the flu-like symptoms of a first outbreak.  How are cold sores diagnosed?  Your healthcare provider makes the diagnosis mainly by looking at the sores and doing a clinical exam.  This may be confirmed by swab tests or blood tests.  How can I prevent cold sores?  You can help reduce the spread of the herpes viruses that cause cold sores. This can help both you and others avoid getting cold sores. Follow these tips:  · Do not kiss others if you have a cold sore. Also avoid kissing someone with a cold sore.  · Do not share eating utensils, lip balm, razors, or towels with someone who has a cold sore.  · Wash your hands after touching the area of a cold sore. The herpes virus can be carried from your face to your hands when you touch the area of a cold sore. When this happens, wash your hands thoroughly, for at least 20  seconds. When you cant wash with soap and water, use an alcohol-based hand .  · Disinfect things you touch often, such as phones and keyboards.    · If you feel a cold sore coming on, do the same things you would do when a cold sore is present to avoid spreading the virus.  · Use condoms to help prevent passing on the viruses through sex.  What are the possible complications of a cold sore?  Cold sores usually go away by themselves within 2 weeks. For most people cold sores are not serious. The viruses that cause cold sores can cause more serious illness, though. People who have a weak immune system may get more serious infections from herpes viruses. These include people being treated for cancer or who have HIV disease. Babies may also become very ill from a herpes infection.      When should I call my healthcare provider?  Call your healthcare provider right away if you have any of these:  · Fever of 100.4°F (38°C) or higher, or as directed  · Pain that gets worse  · You cannot eat or drink because of painful sores  · Symptoms dont get better within 5 to 7 days  · Blisters spread beyond the mouth or lip to areas on the chest, arms, face, or legs   Date Last Reviewed: 3/28/2016  © 1839-7766 [employer]. [address] [place] [address], [place] [address]. All rights reserved. This information is not intended as a substitute for professional medical care. Always follow your healthcare professional's instructions.

## 2018-05-10 NOTE — PROGRESS NOTES
Subjective:       Patient ID: Zoraida Mendoza is a 37 y.o. female.    Vitals:  height is 5' (1.524 m) and weight is 51.3 kg (113 lb). Her oral temperature is 98.1 °F (36.7 °C). Her blood pressure is 138/83 and her pulse is 76. Her oxygen saturation is 100%.     Chief Complaint: Blister (mouth)    Patient presents with red lesions on both lips, was treated here one month prior for bronchitis. No known sick contacts.       Mouth Lesions    The current episode started 3 to 5 days ago. The onset was sudden. The problem occurs continuously. The problem has been rapidly worsening. The problem is moderate. Relieved by: abreva. Associated symptoms include mouth sores and rash. Pertinent negatives include no fever and no sore throat. She has been behaving normally. She has been eating and drinking normally. Urine output has been normal. The last void occurred less than 6 hours ago. There were no sick contacts. Recently, medical care has been given at this facility. Services received include medications given.     Review of Systems   Constitution: Negative for chills and fever.   HENT: Positive for mouth sores. Negative for sore throat.    Respiratory: Negative for shortness of breath.    Skin: Positive for flushing, itching, rash and suspicious lesions.        Blister on lips   Musculoskeletal: Negative for joint pain.       Objective:      Physical Exam   Constitutional: She is oriented to person, place, and time. She appears well-developed and well-nourished. She is cooperative.  Non-toxic appearance. She does not appear ill. No distress.   HENT:   Head: Normocephalic and atraumatic.   Right Ear: Hearing, tympanic membrane and ear canal normal.   Left Ear: Hearing, tympanic membrane and ear canal normal.   Nose: No mucosal edema, rhinorrhea or nasal deformity. No epistaxis. Right sinus exhibits no maxillary sinus tenderness and no frontal sinus tenderness. Left sinus exhibits no maxillary sinus tenderness and no frontal  sinus tenderness.   Mouth/Throat: Uvula is midline and mucous membranes are normal. No trismus in the jaw. Normal dentition. No uvula swelling. No posterior oropharyngeal erythema.       Eyes: Conjunctivae and lids are normal. Right eye exhibits no discharge. Left eye exhibits no discharge. No scleral icterus.   Sclera clear bilat   Neck: Trachea normal, normal range of motion, full passive range of motion without pain and phonation normal. Neck supple.   Cardiovascular: Normal rate, regular rhythm, normal heart sounds, intact distal pulses and normal pulses.    Pulmonary/Chest: Effort normal and breath sounds normal. No respiratory distress.   Abdominal: Soft. Normal appearance and bowel sounds are normal. She exhibits no distension, no pulsatile midline mass and no mass. There is no tenderness.   Musculoskeletal: Normal range of motion. She exhibits no edema or deformity.   Neurological: She is alert and oriented to person, place, and time. She exhibits normal muscle tone. Coordination normal.   Skin: Skin is warm, dry and intact. She is not diaphoretic. No pallor.   Psychiatric: She has a normal mood and affect. Her speech is normal and behavior is normal. Judgment and thought content normal. Cognition and memory are normal.   Nursing note and vitals reviewed.      Assessment:       1. Oral herpes        Plan:       Patient Instructions       Understanding Cold Sores  Cold sores are small blisters or sores on the lip or sometimes inside the mouth. Many people get them from time to time. Cold sores usually are not serious, and they usually heal in a week or two. They are caused by 2 related viruses, herpes simplex type 1 and 2. These viruses spread very easily. Many people have one or both of these viruses in their body. More than 4 in every 5 people are infected with herpes simplex type 1. Once you have the virus that causes cold sores, it stays in your body for the rest of your life. But it can be inactive for  long periods.  What causes a cold sore?  Cold sores are usually caused by herpes simplex virus type 1. Less often, they are caused by herpes simplex virus type 2. Herpes simplex virus type 2 is the more common cause of genital sores. The herpes viruses can enter the body through a break in the skin such as a scrape. Or they may enter through mucous membranes such as the lips or mouth. Some ways to get the viruses include:  · Kissing someone who has a cold sore  · Sharing a drinking glass, eating utensils, or lip balm with someone who has a cold sore  · Having oral sex with someone who has a cold sore  A  baby can also get the infection at birth.  If you have a herpes virus, you can to pass it along even when you dont have a sore.  Cold sores flare up occasionally. Things that can cause an outbreak include:  · Sun exposure  · Fever  · Stress or exhaustion  · Menstruation  · Skin irritation  · Another unrelated Illness such as pneumonia, urinary infection, or cancer  What are the symptoms of a cold sore?  Symptoms can include:  · A blister-like sore or cluster of sores. These often occur at the edge of the lips but may appear inside the mouth.  · Skin redness around the sores.  · Pain or itching in the area of the outbreak. Often the pain or itching develops 12 to 24 hours before the sore become visible.  · Flu-like symptoms, including swollen glands, headache, body ache, or fever. These typically occur only at the time of the first infection.  Cold sores may also occur on fingers. They may rarely infect the eyes, a serious possible complication.  Some people have symptoms a day or two before an outbreak. They may feel tenderness, burning, itching, or tingling before a cold sore appears. Cold sores tend to come back in the same area that they first appeared.  How are cold sores treated?  Treatment for cold sores focuses on relieving and shortening symptoms. For people with frequent outbreaks, treatment works  to decrease how often future episodes.  Treatments may include:  · Prescription or over-the-counter pain medicines. These can help with discomfort, especially if sores are inside the mouth.  · Antiviral medicines. These may be pills that are taken by mouth or a cream to apply to sores. They may help shorten an outbreak and reduce the severity of symptoms.  They may be used to help prevent future outbreaks if you have disabling recurrent infections.  · Self-care such as extra rest and drinking more fluids. These may help relieve the flu-like symptoms of a first outbreak.  How are cold sores diagnosed?  Your healthcare provider makes the diagnosis mainly by looking at the sores and doing a clinical exam.  This may be confirmed by swab tests or blood tests.  How can I prevent cold sores?  You can help reduce the spread of the herpes viruses that cause cold sores. This can help both you and others avoid getting cold sores. Follow these tips:  · Do not kiss others if you have a cold sore. Also avoid kissing someone with a cold sore.  · Do not share eating utensils, lip balm, razors, or towels with someone who has a cold sore.  · Wash your hands after touching the area of a cold sore. The herpes virus can be carried from your face to your hands when you touch the area of a cold sore. When this happens, wash your hands thoroughly, for at least 20 seconds. When you cant wash with soap and water, use an alcohol-based hand .  · Disinfect things you touch often, such as phones and keyboards.    · If you feel a cold sore coming on, do the same things you would do when a cold sore is present to avoid spreading the virus.  · Use condoms to help prevent passing on the viruses through sex.  What are the possible complications of a cold sore?  Cold sores usually go away by themselves within 2 weeks. For most people cold sores are not serious. The viruses that cause cold sores can cause more serious illness, though. People  who have a weak immune system may get more serious infections from herpes viruses. These include people being treated for cancer or who have HIV disease. Babies may also become very ill from a herpes infection.      When should I call my healthcare provider?  Call your healthcare provider right away if you have any of these:  · Fever of 100.4°F (38°C) or higher, or as directed  · Pain that gets worse  · You cannot eat or drink because of painful sores  · Symptoms dont get better within 5 to 7 days  · Blisters spread beyond the mouth or lip to areas on the chest, arms, face, or legs   Date Last Reviewed: 3/28/2016  © 1772-1473 [employer]. [address] [place] [address], [place] [address]. All rights reserved. This information is not intended as a substitute for professional medical care. Always follow your healthcare professional's instructions.          Oral herpes    Other orders  -     acyclovir (ZOVIRAX) 400 MG tablet; Take 1 tablet (400 mg total) by mouth 3 (three) times daily.  Dispense: 21 tablet; Refill: 0

## 2018-11-09 ENCOUNTER — PATIENT OUTREACH (OUTPATIENT)
Dept: ADMINISTRATIVE | Facility: HOSPITAL | Age: 38
End: 2018-11-09

## 2018-11-09 DIAGNOSIS — Z12.39 BREAST CANCER SCREENING: ICD-10-CM

## 2018-11-09 NOTE — PROGRESS NOTES
Dear Zoraida Mendoza,        Our records indicate that you are due for a mammogram.    In the [place], one in nine women will develop breast cancer during their lifetime. While there is no way to prevent breast cancer, early detection provides the best opportunity for curing it.    For women over the age of 40, the American Cancer Society recommends a yearly clinical breast exam and a yearly mammogram. These practices have saved thousands of lives. We need your help to ensure that you are receiving optimal medical care.    Please make an appointment for a mammogram at your earliest convenience. Please call [phone] to schedule. Thank you for choosing [facility] for your healthcare needs. Have a great day.    Sincerely,    BEBETO Monterroso  Clinical Care Coordinator  Internal Medicine  [facility]/[place]

## 2019-05-29 ENCOUNTER — OFFICE VISIT (OUTPATIENT)
Dept: URGENT CARE | Facility: CLINIC | Age: 39
End: 2019-05-29
Payer: COMMERCIAL

## 2019-05-29 VITALS
HEIGHT: 60 IN | BODY MASS INDEX: 29.45 KG/M2 | HEART RATE: 66 BPM | RESPIRATION RATE: 16 BRPM | OXYGEN SATURATION: 100 % | WEIGHT: 150 LBS | TEMPERATURE: 99 F | DIASTOLIC BLOOD PRESSURE: 100 MMHG | SYSTOLIC BLOOD PRESSURE: 151 MMHG

## 2019-05-29 DIAGNOSIS — R03.0 ELEVATED BP WITHOUT DIAGNOSIS OF HYPERTENSION: ICD-10-CM

## 2019-05-29 DIAGNOSIS — B00.2 ORAL HERPES: Primary | ICD-10-CM

## 2019-05-29 PROCEDURE — 3008F BODY MASS INDEX DOCD: CPT | Mod: CPTII,S$GLB,, | Performed by: NURSE PRACTITIONER

## 2019-05-29 PROCEDURE — 99214 PR OFFICE/OUTPT VISIT, EST, LEVL IV, 30-39 MIN: ICD-10-PCS | Mod: S$GLB,,, | Performed by: NURSE PRACTITIONER

## 2019-05-29 PROCEDURE — 99214 OFFICE O/P EST MOD 30 MIN: CPT | Mod: S$GLB,,, | Performed by: NURSE PRACTITIONER

## 2019-05-29 PROCEDURE — 3008F PR BODY MASS INDEX (BMI) DOCUMENTED: ICD-10-PCS | Mod: CPTII,S$GLB,, | Performed by: NURSE PRACTITIONER

## 2019-05-29 RX ORDER — VALACYCLOVIR HYDROCHLORIDE 1 G/1
2000 TABLET, FILM COATED ORAL EVERY 12 HOURS
Qty: 4 TABLET | Refills: 2 | Status: SHIPPED | OUTPATIENT
Start: 2019-05-29 | End: 2019-05-30

## 2019-05-29 NOTE — PATIENT INSTRUCTIONS
Please return here or go to the Emergency Department for any concerns or worsening of condition.  If you were prescribed antibiotics, please take them to completion.  If you were prescribed a narcotic medication, do not drive or operate heavy equipment or machinery while taking these medications.  Please follow up with your primary care doctor or specialist as needed.    If you  smoke, please stop smoking.    Understanding Cold Sores  Cold sores are small blisters or sores on the lip or sometimes inside the mouth. Many people get them from time to time. Cold sores usually are not serious, and they usually heal in a week or two. They are caused by 2 related viruses, herpes simplex type 1 and 2. These viruses spread very easily. Many people have one or both of these viruses in their body. More than 4 in every 5 people are infected with herpes simplex type 1. Once you have the virus that causes cold sores, it stays in your body for the rest of your life. But it can be inactive for long periods.  What causes a cold sore?  Cold sores are usually caused by herpes simplex virus type 1. Less often, they are caused by herpes simplex virus type 2. Herpes simplex virus type 2 is the more common cause of genital sores. The herpes viruses can enter the body through a break in the skin such as a scrape. Or they may enter through mucous membranes such as the lips or mouth. Some ways to get the viruses include:  · Kissing someone who has a cold sore  · Sharing a drinking glass, eating utensils, or lip balm with someone who has a cold sore  · Having oral sex with someone who has a cold sore  A  baby can also get the infection at birth.  If you have a herpes virus, you can to pass it along even when you dont have a sore.  Cold sores flare up occasionally. Things that can cause an outbreak include:  · Sun exposure  · Fever  · Stress or exhaustion  · Menstruation  · Skin irritation  · Another unrelated Illness such as  pneumonia, urinary infection, or cancer  What are the symptoms of a cold sore?  Symptoms can include:  · A blister-like sore or cluster of sores. These often occur at the edge of the lips but may appear inside the mouth.  · Skin redness around the sores.  · Pain or itching in the area of the outbreak. Often the pain or itching develops 12 to 24 hours before the sore become visible.  · Flu-like symptoms, including swollen glands, headache, body ache, or fever. These typically occur only at the time of the first infection.  Cold sores may also occur on fingers. They may rarely infect the eyes, a serious possible complication.  Some people have symptoms a day or two before an outbreak. They may feel tenderness, burning, itching, or tingling before a cold sore appears. Cold sores tend to come back in the same area that they first appeared.  How are cold sores treated?  Treatment for cold sores focuses on relieving and shortening symptoms. For people with frequent outbreaks, treatment works to decrease how often future episodes.  Treatments may include:  · Prescription or over-the-counter pain medicines. These can help with discomfort, especially if sores are inside the mouth.  · Antiviral medicines. These may be pills that are taken by mouth or a cream to apply to sores. They may help shorten an outbreak and reduce the severity of symptoms.  They may be used to help prevent future outbreaks if you have disabling recurrent infections.  · Self-care such as extra rest and drinking more fluids. These may help relieve the flu-like symptoms of a first outbreak.  How are cold sores diagnosed?  Your healthcare provider makes the diagnosis mainly by looking at the sores and doing a clinical exam.  This may be confirmed by swab tests or blood tests.  How can I prevent cold sores?  You can help reduce the spread of the herpes viruses that cause cold sores. This can help both you and others avoid getting cold sores. Follow these  tips:  · Do not kiss others if you have a cold sore. Also avoid kissing someone with a cold sore.  · Do not share eating utensils, lip balm, razors, or towels with someone who has a cold sore.  · Wash your hands after touching the area of a cold sore. The herpes virus can be carried from your face to your hands when you touch the area of a cold sore. When this happens, wash your hands thoroughly, for at least 20 seconds. When you cant wash with soap and water, use an alcohol-based hand .  · Disinfect things you touch often, such as phones and keyboards.    · If you feel a cold sore coming on, do the same things you would do when a cold sore is present to avoid spreading the virus.  · Use condoms to help prevent passing on the viruses through sex.  What are the possible complications of a cold sore?  Cold sores usually go away by themselves within 2 weeks. For most people cold sores are not serious. The viruses that cause cold sores can cause more serious illness, though. People who have a weak immune system may get more serious infections from herpes viruses. These include people being treated for cancer or who have HIV disease. Babies may also become very ill from a herpes infection.      When should I call my healthcare provider?  Call your healthcare provider right away if you have any of these:  · Fever of 100.4°F (38°C) or higher, or as directed  · Pain that gets worse  · You cannot eat or drink because of painful sores  · Symptoms dont get better within 5 to 7 days  · Blisters spread beyond the mouth or lip to areas on the chest, arms, face, or legs   Date Last Reviewed: 3/28/2016  © 4449-4756 [employer]. [address] [place] [address], [place] [address]. All rights reserved. This information is not intended as a substitute for professional medical care. Always follow your healthcare professional's instructions.

## 2019-05-29 NOTE — PROGRESS NOTES
Subjective:       Patient ID: Zoraida Mendoza is a 38 y.o. female.    Vitals:  height is 5' (1.524 m) and weight is 68 kg (150 lb). Her temperature is 98.5 °F (36.9 °C). Her blood pressure is 151/100 (abnormal) and her pulse is 66. Her respiration is 16 and oxygen saturation is 100%.     Chief Complaint: Mouth Lesions    Patient states her lip was itching yesterday and she woke up this morning with a cold sore.  Has had them in the past.  Put Abreva on it without relief.    Mouth Lesions    The current episode started yesterday. The onset was sudden. The problem occurs rarely. The problem has been rapidly worsening. The problem is moderate. The symptoms are relieved by one or more prescription drugs and one or more OTC medications. Associated symptoms include mouth sores. Pertinent negatives include no fever, no double vision, no diarrhea, no nausea, no vomiting, no congestion, no headaches, no sore throat, no cough and no rash. She has been behaving normally. She has been eating and drinking normally.       Constitution: Negative for chills, fatigue and fever.   HENT: Positive for mouth sores. Negative for congestion and sore throat.    Neck: Negative for painful lymph nodes.   Cardiovascular: Negative for chest pain and leg swelling.   Eyes: Negative for double vision and blurred vision.   Respiratory: Negative for cough and shortness of breath.    Gastrointestinal: Negative for nausea, vomiting and diarrhea.   Genitourinary: Negative for dysuria, frequency, urgency and history of kidney stones.   Musculoskeletal: Negative for joint pain, joint swelling, muscle cramps and muscle ache.   Skin: Negative for color change, pale, rash and bruising.   Allergic/Immunologic: Negative for seasonal allergies.   Neurological: Negative for dizziness, history of vertigo, light-headedness, passing out and headaches.   Hematologic/Lymphatic: Negative for swollen lymph nodes.   Psychiatric/Behavioral: Negative for  nervous/anxious, sleep disturbance and depression. The patient is not nervous/anxious.        Objective:      Physical Exam   Constitutional: She is oriented to person, place, and time. She appears well-developed and well-nourished. She is cooperative.  Non-toxic appearance. She does not appear ill. No distress.   Elevated bp   HENT:   Head: Normocephalic and atraumatic.   Right Ear: Hearing, tympanic membrane, external ear and ear canal normal.   Left Ear: Hearing, tympanic membrane, external ear and ear canal normal.   Nose: Nose normal. No mucosal edema, rhinorrhea or nasal deformity. No epistaxis. Right sinus exhibits no maxillary sinus tenderness and no frontal sinus tenderness. Left sinus exhibits no maxillary sinus tenderness and no frontal sinus tenderness.   Mouth/Throat: Uvula is midline and mucous membranes are normal. Oral lesions present. No trismus in the jaw. Normal dentition. No uvula swelling. No posterior oropharyngeal erythema.       Eyes: Conjunctivae and lids are normal. No scleral icterus.   Sclera clear bilat   Neck: Trachea normal, normal range of motion, full passive range of motion without pain and phonation normal. Neck supple.   Cardiovascular: Normal rate, regular rhythm, normal heart sounds and normal pulses.   Pulmonary/Chest: Effort normal and breath sounds normal. No respiratory distress.   Abdominal: Normal appearance.   Musculoskeletal: Normal range of motion. She exhibits no edema or deformity.   Lymphadenopathy:     She has no cervical adenopathy.   Neurological: She is alert and oriented to person, place, and time. She exhibits normal muscle tone. Coordination normal.   Skin: Skin is warm, dry and intact. Capillary refill takes less than 2 seconds. She is not diaphoretic. No pallor.   Psychiatric: She has a normal mood and affect. Her speech is normal and behavior is normal. Judgment and thought content normal. Cognition and memory are normal.   Nursing note and vitals  reviewed.      Assessment:       1. Oral herpes    2. Elevated BP without diagnosis of hypertension        Plan:         Oral herpes  -     valACYclovir (VALTREX) 1000 MG tablet; Take 2 tablets (2,000 mg total) by mouth every 12 (twelve) hours. for 1 day  Dispense: 4 tablet; Refill: 2    Elevated BP without diagnosis of hypertension      Patient Instructions       Please return here or go to the Emergency Department for any concerns or worsening of condition.  If you were prescribed antibiotics, please take them to completion.  If you were prescribed a narcotic medication, do not drive or operate heavy equipment or machinery while taking these medications.  Please follow up with your primary care doctor or specialist as needed.    If you  smoke, please stop smoking.    Understanding Cold Sores  Cold sores are small blisters or sores on the lip or sometimes inside the mouth. Many people get them from time to time. Cold sores usually are not serious, and they usually heal in a week or two. They are caused by 2 related viruses, herpes simplex type 1 and 2. These viruses spread very easily. Many people have one or both of these viruses in their body. More than 4 in every 5 people are infected with herpes simplex type 1. Once you have the virus that causes cold sores, it stays in your body for the rest of your life. But it can be inactive for long periods.  What causes a cold sore?  Cold sores are usually caused by herpes simplex virus type 1. Less often, they are caused by herpes simplex virus type 2. Herpes simplex virus type 2 is the more common cause of genital sores. The herpes viruses can enter the body through a break in the skin such as a scrape. Or they may enter through mucous membranes such as the lips or mouth. Some ways to get the viruses include:  · Kissing someone who has a cold sore  · Sharing a drinking glass, eating utensils, or lip balm with someone who has a cold sore  · Having oral sex with someone  who has a cold sore  A  baby can also get the infection at birth.  If you have a herpes virus, you can to pass it along even when you dont have a sore.  Cold sores flare up occasionally. Things that can cause an outbreak include:  · Sun exposure  · Fever  · Stress or exhaustion  · Menstruation  · Skin irritation  · Another unrelated Illness such as pneumonia, urinary infection, or cancer  What are the symptoms of a cold sore?  Symptoms can include:  · A blister-like sore or cluster of sores. These often occur at the edge of the lips but may appear inside the mouth.  · Skin redness around the sores.  · Pain or itching in the area of the outbreak. Often the pain or itching develops 12 to 24 hours before the sore become visible.  · Flu-like symptoms, including swollen glands, headache, body ache, or fever. These typically occur only at the time of the first infection.  Cold sores may also occur on fingers. They may rarely infect the eyes, a serious possible complication.  Some people have symptoms a day or two before an outbreak. They may feel tenderness, burning, itching, or tingling before a cold sore appears. Cold sores tend to come back in the same area that they first appeared.  How are cold sores treated?  Treatment for cold sores focuses on relieving and shortening symptoms. For people with frequent outbreaks, treatment works to decrease how often future episodes.  Treatments may include:  · Prescription or over-the-counter pain medicines. These can help with discomfort, especially if sores are inside the mouth.  · Antiviral medicines. These may be pills that are taken by mouth or a cream to apply to sores. They may help shorten an outbreak and reduce the severity of symptoms.  They may be used to help prevent future outbreaks if you have disabling recurrent infections.  · Self-care such as extra rest and drinking more fluids. These may help relieve the flu-like symptoms of a first outbreak.  How are  cold sores diagnosed?  Your healthcare provider makes the diagnosis mainly by looking at the sores and doing a clinical exam.  This may be confirmed by swab tests or blood tests.  How can I prevent cold sores?  You can help reduce the spread of the herpes viruses that cause cold sores. This can help both you and others avoid getting cold sores. Follow these tips:  · Do not kiss others if you have a cold sore. Also avoid kissing someone with a cold sore.  · Do not share eating utensils, lip balm, razors, or towels with someone who has a cold sore.  · Wash your hands after touching the area of a cold sore. The herpes virus can be carried from your face to your hands when you touch the area of a cold sore. When this happens, wash your hands thoroughly, for at least 20 seconds. When you cant wash with soap and water, use an alcohol-based hand .  · Disinfect things you touch often, such as phones and keyboards.    · If you feel a cold sore coming on, do the same things you would do when a cold sore is present to avoid spreading the virus.  · Use condoms to help prevent passing on the viruses through sex.  What are the possible complications of a cold sore?  Cold sores usually go away by themselves within 2 weeks. For most people cold sores are not serious. The viruses that cause cold sores can cause more serious illness, though. People who have a weak immune system may get more serious infections from herpes viruses. These include people being treated for cancer or who have HIV disease. Babies may also become very ill from a herpes infection.      When should I call my healthcare provider?  Call your healthcare provider right away if you have any of these:  · Fever of 100.4°F (38°C) or higher, or as directed  · Pain that gets worse  · You cannot eat or drink because of painful sores  · Symptoms dont get better within 5 to 7 days  · Blisters spread beyond the mouth or lip to areas on the chest, arms, face, or  legs   Date Last Reviewed: 3/28/2016  © 0797-5613 [employer]. [address] [place] [address], [place] [address]. All rights reserved. This information is not intended as a substitute for professional medical care. Always follow your healthcare professional's instructions.

## 2021-04-16 ENCOUNTER — PATIENT MESSAGE (OUTPATIENT)
Dept: RESEARCH | Facility: HOSPITAL | Age: 41
End: 2021-04-16

## 2021-11-22 ENCOUNTER — OFFICE VISIT (OUTPATIENT)
Dept: URGENT CARE | Facility: CLINIC | Age: 41
End: 2021-11-22
Payer: COMMERCIAL

## 2021-11-22 VITALS
BODY MASS INDEX: 24.54 KG/M2 | HEIGHT: 60 IN | DIASTOLIC BLOOD PRESSURE: 106 MMHG | OXYGEN SATURATION: 96 % | TEMPERATURE: 99 F | RESPIRATION RATE: 18 BRPM | HEART RATE: 84 BPM | SYSTOLIC BLOOD PRESSURE: 151 MMHG | WEIGHT: 125 LBS

## 2021-11-22 DIAGNOSIS — R05.9 COUGH: Primary | ICD-10-CM

## 2021-11-22 LAB
CTP QC/QA: YES
CTP QC/QA: YES
POC MOLECULAR INFLUENZA A AGN: NEGATIVE
POC MOLECULAR INFLUENZA B AGN: NEGATIVE
SARS-COV-2 RDRP RESP QL NAA+PROBE: NEGATIVE

## 2021-11-22 PROCEDURE — 99213 PR OFFICE/OUTPT VISIT, EST, LEVL III, 20-29 MIN: ICD-10-PCS | Mod: S$GLB,,, | Performed by: NURSE PRACTITIONER

## 2021-11-22 PROCEDURE — 99213 OFFICE O/P EST LOW 20 MIN: CPT | Mod: S$GLB,,, | Performed by: NURSE PRACTITIONER

## 2021-11-22 PROCEDURE — 87502 INFLUENZA DNA AMP PROBE: CPT | Mod: QW,S$GLB,, | Performed by: NURSE PRACTITIONER

## 2021-11-22 PROCEDURE — U0002 COVID-19 LAB TEST NON-CDC: HCPCS | Mod: QW,S$GLB,, | Performed by: NURSE PRACTITIONER

## 2021-11-22 PROCEDURE — U0002: ICD-10-PCS | Mod: QW,S$GLB,, | Performed by: NURSE PRACTITIONER

## 2021-11-22 PROCEDURE — 87502 POCT INFLUENZA A/B MOLECULAR: ICD-10-PCS | Mod: QW,S$GLB,, | Performed by: NURSE PRACTITIONER

## 2021-11-22 RX ORDER — BENZONATATE 200 MG/1
200 CAPSULE ORAL 3 TIMES DAILY PRN
Qty: 30 CAPSULE | Refills: 0 | Status: SHIPPED | OUTPATIENT
Start: 2021-11-22 | End: 2021-12-02

## 2021-11-22 RX ORDER — FLUTICASONE PROPIONATE 50 MCG
2 SPRAY, SUSPENSION (ML) NASAL DAILY
Qty: 15.8 ML | Refills: 0 | Status: SHIPPED | OUTPATIENT
Start: 2021-11-22 | End: 2021-11-27

## 2022-01-27 ENCOUNTER — OFFICE VISIT (OUTPATIENT)
Dept: URGENT CARE | Facility: CLINIC | Age: 42
End: 2022-01-27
Payer: COMMERCIAL

## 2022-01-27 VITALS
HEART RATE: 78 BPM | DIASTOLIC BLOOD PRESSURE: 82 MMHG | OXYGEN SATURATION: 100 % | TEMPERATURE: 99 F | RESPIRATION RATE: 15 BRPM | HEIGHT: 60 IN | BODY MASS INDEX: 25.52 KG/M2 | SYSTOLIC BLOOD PRESSURE: 151 MMHG | WEIGHT: 130 LBS

## 2022-01-27 DIAGNOSIS — J30.9 ALLERGIC RHINITIS, UNSPECIFIED SEASONALITY, UNSPECIFIED TRIGGER: ICD-10-CM

## 2022-01-27 DIAGNOSIS — J06.9 ACUTE URI: ICD-10-CM

## 2022-01-27 DIAGNOSIS — R05.9 COUGH: Primary | ICD-10-CM

## 2022-01-27 DIAGNOSIS — I10 PRIMARY HYPERTENSION: ICD-10-CM

## 2022-01-27 LAB
CTP QC/QA: YES
SARS-COV-2 RDRP RESP QL NAA+PROBE: NEGATIVE

## 2022-01-27 PROCEDURE — 1160F RVW MEDS BY RX/DR IN RCRD: CPT | Mod: CPTII,S$GLB,, | Performed by: FAMILY MEDICINE

## 2022-01-27 PROCEDURE — U0002: ICD-10-PCS | Mod: QW,S$GLB,, | Performed by: FAMILY MEDICINE

## 2022-01-27 PROCEDURE — 3079F DIAST BP 80-89 MM HG: CPT | Mod: CPTII,S$GLB,, | Performed by: FAMILY MEDICINE

## 2022-01-27 PROCEDURE — 3008F PR BODY MASS INDEX (BMI) DOCUMENTED: ICD-10-PCS | Mod: CPTII,S$GLB,, | Performed by: FAMILY MEDICINE

## 2022-01-27 PROCEDURE — 3079F PR MOST RECENT DIASTOLIC BLOOD PRESSURE 80-89 MM HG: ICD-10-PCS | Mod: CPTII,S$GLB,, | Performed by: FAMILY MEDICINE

## 2022-01-27 PROCEDURE — 1159F PR MEDICATION LIST DOCUMENTED IN MEDICAL RECORD: ICD-10-PCS | Mod: CPTII,S$GLB,, | Performed by: FAMILY MEDICINE

## 2022-01-27 PROCEDURE — U0002 COVID-19 LAB TEST NON-CDC: HCPCS | Mod: QW,S$GLB,, | Performed by: FAMILY MEDICINE

## 2022-01-27 PROCEDURE — 3077F SYST BP >= 140 MM HG: CPT | Mod: CPTII,S$GLB,, | Performed by: FAMILY MEDICINE

## 2022-01-27 PROCEDURE — 3077F PR MOST RECENT SYSTOLIC BLOOD PRESSURE >= 140 MM HG: ICD-10-PCS | Mod: CPTII,S$GLB,, | Performed by: FAMILY MEDICINE

## 2022-01-27 PROCEDURE — 3008F BODY MASS INDEX DOCD: CPT | Mod: CPTII,S$GLB,, | Performed by: FAMILY MEDICINE

## 2022-01-27 PROCEDURE — 1160F PR REVIEW ALL MEDS BY PRESCRIBER/CLIN PHARMACIST DOCUMENTED: ICD-10-PCS | Mod: CPTII,S$GLB,, | Performed by: FAMILY MEDICINE

## 2022-01-27 PROCEDURE — 99214 PR OFFICE/OUTPT VISIT, EST, LEVL IV, 30-39 MIN: ICD-10-PCS | Mod: S$GLB,CS,, | Performed by: FAMILY MEDICINE

## 2022-01-27 PROCEDURE — 1159F MED LIST DOCD IN RCRD: CPT | Mod: CPTII,S$GLB,, | Performed by: FAMILY MEDICINE

## 2022-01-27 PROCEDURE — 99214 OFFICE O/P EST MOD 30 MIN: CPT | Mod: S$GLB,CS,, | Performed by: FAMILY MEDICINE

## 2022-01-27 RX ORDER — BENZONATATE 100 MG/1
200 CAPSULE ORAL 3 TIMES DAILY PRN
Qty: 20 CAPSULE | Refills: 0 | Status: SHIPPED | OUTPATIENT
Start: 2022-01-27

## 2022-01-27 NOTE — PATIENT INSTRUCTIONS
Patient Education       Cough Discharge Instructions, Adult   About this topic   Many things can cause a cough. A cold or allergies can cause a cough. Other times, asthma or smoking can cause your cough. You can have a cough from mucus that drips down the back of your throat or from stomach acid that backs up into your throat. Sometimes a cough is a side effect from a drug. You may be waiting on some test results. If so, the staff will contact you if there are concerning results.  What care is needed at home?   · Ask your doctor what you need to do when you go home. Make sure you ask questions if you do not understand what the doctor says.  · To help you feel better:  ? Use a cool mist humidifier to avoid breathing dry air.  ? Use hard candy or cough drops to soothe sore throat and cough.  ? Gargle with salt water (mix 1/2 teaspoon salt with 1 cup warm water) a few times a day.  ? Spray saltwater mist in each nostril. Any normal saline spray works.  ? Sip warm liquids to keep your throat moist.  ? Take warm, steamy showers to help soothe the cough.  · Do not smoke or be in smoke-filled places. Avoid things that may cause breathing problems like vaping, fumes, pollution, dust, and other common allergens.  · You may want to use over-the-counter medicines for allergies or acid reflux if your cough is due to one of these problems.  · You can also use an over-the-counter cough medicine.  What follow-up care is needed?   Your doctor may ask you to make visits to the office to check on your progress. Be sure to keep these visits.  What drugs may be needed?   The doctor may order drugs to:  · Control coughing  · Get rid of or thin mucus  · Block allergens if your cough is due to allergies  · Increase airflow if your cough is due to asthma or COPD  · Reduce swelling of the airways  · Reduce stomach acid if your cough is due to stomach acid problems  Will physical activity be limited?   Your cough may interfere with some of  your activities.  What changes to diet are needed?   Some people feel milk products worsen their sputum production and worsen their cough. There is no proof that this is true. There is no need to reduce milk intake. Honey has been shown to be as effective as the leading over-the-counter (OTC) drug for calming coughs. Use 1/2 to 1 teaspoon (2.5 mL to 5 mL) of honey as needed. It can thin the secretions and loosen the cough. Never give honey to a child under the age of 1.  What can be done to prevent this health problem?   · Wash your hands often with soap and water for at least 20 seconds, especially after coughing or sneezing. Alcohol-based hand sanitizers also work to kill the virus.       · If you are sick, cover your mouth and nose with tissue when you cough or sneeze. You can also cough into your elbow. Throw away tissues in the trash and wash your hands after touching used tissues.  · Clean items and surfaces you most often touch like door handles, remotes, phones, or toys. Wipe them with a disinfectant. This can help reduce the spread of infection.  · Do not get too close (kissing, hugging) to people who are sick.  · Do not share towels or hankies with anyone who is sick.  · Stay away from crowded places.  · Get a flu shot each year.     When do I need to call the doctor?   · You have chest pain when you cough or trouble breathing.  · You start to cough up blood or yellow or green mucus.  · You have a fever of 100.4°F (38°C) or higher or chills.  · You cough so hard you throw up.  · You are still coughing in 10 days.  Teach Back: Helping You Understand   The Teach Back Method helps you understand the information we are giving you. After you talk with the staff, tell them in your own words what you learned. This helps to make sure the staff has described each thing clearly. It also helps to explain things that may have been confusing. Before going home, make sure you can do these:  · I can tell you about my  condition.  · I can tell you what I can do to help thin the mucus and ease my cough.  · I can tell you what I will do if I have wheezing, chest tightness, my lips turn blue with coughing, or I have other trouble breathing.  Where can I learn more?   American Academy [employer]icians  https://familydoctor.org/cough-medicine-understanding-your-otc-options/   [facility] Choices  https://www.nhs.uk/conditions/cough/   Last Reviewed Date   2021-06-10  Consumer Information Use and Disclaimer   This information is not specific medical advice and does not replace information you receive from your health care provider. This is only a brief summary of general information. It does NOT include all information about conditions, illnesses, injuries, tests, procedures, treatments, therapies, discharge instructions or life-style choices that may apply to you. You must talk with your health care provider for complete information about your health and treatment options. This information should not be used to decide whether or not to accept your health care providers advice, instructions or recommendations. Only your health care provider has the knowledge and training to provide advice that is right for you.  Copyright   Copyright © 2021 [employer] and its affiliates and/or licensors. All rights reserved.  Patient Education       High Blood Pressure Discharge Instructions   About this topic   The medical name for high blood pressure is hypertension. Your blood pressure is measured with 2 numbers. For example, you may hear the staff say your blood pressure is 130 over 80. High blood pressure cannot be cured. You must control it with drugs and lifestyle changes. High blood pressure puts you at risk for heart attack, stroke, and kidney disease.         What care is needed at home?   · Ask your doctor what you need to do when you go home. Make sure you ask questions if you do not understand what the doctor says. This way you will know  what you need to do.  · Take all your medicines as ordered. Do not stop taking any of your regular medicines without talking to your doctor.  · Learn how to check your blood pressure at home, if your doctor suggests you do this.  What follow-up care is needed?   Your doctor may ask you to make visits to the office to check on your progress. Be sure to keep these visits.  What drugs may be needed?   The doctor may order drugs to help control your high blood pressure. Take your drugs as ordered. Do not take other prescription drugs or over-the-counter (OTC) drugs without talking to your doctor first.  Will physical activity be limited?   Talk to your doctor about the right amount of activity for you. Exercise may help you lose weight and lower your blood pressure.  What changes to diet are needed?   · Do not use salt on your food. Use herbs and spices to improve the taste.  · Eat less than 1,500 mg of sodium a day. Read food labels to see how much sodium is in a food.  · Limit coffee, tea, and soda to 2 cups (480 mL) a day.  · Limit beer, wine, and mixed drinks (alcohol) to 1 drink a day for women and 2 drinks a day for men.  · Eat lots of fruits, vegetables, and low-fat dairy products.  · Avoid fatty foods like fried foods or chips.  · Talk with your dietitian about the diet changes you need and the number of calories you should eat each day.  What problems could happen?   · Heart attack, heart failure, or other heart problems  · Stroke  · Swelling of blood vessels  · Kidney failure  · Loss of eyesight  · Memory problems  · Fluid in the lungs  · Death  What can be done to prevent this health problem?   · Exercise regularly. Try to get at least 30 minutes of exercise most days of the week.  · Avoid weight gain.  · Stop smoking. Your doctor can tell you about stop smoking programs.  · Learn to lower stress.  When do I need to call the doctor?   · You have signs of a heart attack, which may include:  ? Severe chest  pain, pressure, or discomfort with:  § Breathing trouble, sweating, upset stomach, or cold, clammy skin  § Pain in your arms, back, or jaw  § Worse pain with activity like walking up stairs  ? Fast or irregular heartbeat  ? Feeling dizzy, faint, or weak  · You have signs of stroke like sudden:  ? Numbness or weakness of the face, arm, or leg, especially on one side of the body  ? Confusion, trouble speaking, or understanding  ? Trouble seeing in one or both eyes  ? Trouble walking, dizziness, loss of balance, or coordination  ? Severe headache with no known cause  · You have a seizure or pass out.  · You have a severe headache with an upset stomach or throwing up.  · You have sudden, severe back pain.  · You have 2 home blood pressure readings higher than 180/120.  · Your urine is brown or bloody.  Teach Back: Helping You Understand   The Teach Back Method helps you understand the information we are giving you. After you talk with the staff, tell them in your own words what you learned. This helps to make sure the staff has described each thing clearly. It also helps to explain things that may have been confusing. Before going home, make sure you can do these:  · I can tell you about my condition.  · I can tell you what numbers are too high for my blood pressure.  · I can tell you what I will do if I have signs of a heart attack or stroke.  Where can I learn more?   American Academy of Family Physicians  https://[email or web address]/condition/high-blood-pressure/   American Academy of Family Physicians  https://familydoctor.org/the-dash-diet-healthy-eating-to-control-your-blood-pressure/   American Heart Association  http://www.heart.org/HEARTORG/Conditions/HighBloodPressure/AboutHighBloodPressure/About-High-Blood-Pressure_UCM_002050_Article.jsp#.[email or web address]   [facility] Choices  http://www.nhs.uk/conditions/blood-pressure-(high)/pages/introduction.aspx    [employer]  https://www.uptodate.com/contents/high-blood-pressure-in-adults-beyond-the-basics?source=see_link   Last Reviewed Date   2021-06-08  Consumer Information Use and Disclaimer   This information is not specific medical advice and does not replace information you receive from your health care provider. This is only a brief summary of general information. It does NOT include all information about conditions, illnesses, injuries, tests, procedures, treatments, therapies, discharge instructions or life-style choices that may apply to you. You must talk with your health care provider for complete information about your health and treatment options. This information should not be used to decide whether or not to accept your health care providers advice, instructions or recommendations. Only your health care provider has the knowledge and training to provide advice that is right for you.  Copyright   Copyright © 2021 [employer] and its affiliates and/or licensors. All rights reserved.  Patient Education       Seasonal Allergies Discharge Instructions   About this topic   Seasonal allergies are also called allergic rhinitis or hay fever. You may have sneezing; a stuffy or runny nose; or itchy throat, ears, or eyes. You may feel very tired. Most often, this problem is caused by:  · Pollens from trees, grasses, or weeds.  · Mold spores that grow when the air is humid, wet, or damp.  Some people can breathe in these things and have no problems. But when you have a seasonal allergy, your body acts as if the substance is harming you. Then you have symptoms. You may have symptoms only at some times of the year. If your symptoms last all year, they may be caused by:  · Insects, such as dust mites or cock roaches.  · Animals, such as cats or dogs.  · Mold spores.     What care is needed at home?   · Ask your doctor what you need to do when you go home. Make sure you ask questions if you do not understand what the  doctor says.  · Rinse your nose with salt water to get rid of pollen inside of your nose.  · Keep the windows closed and use air conditioning.  · Shower before bed to rinse pollen from your hair and skin.  · Wear a dust mask if you need to be outside.  · Use over-the-counter medicines to help with your symptoms:  ? A steroid nose spray can help with a stuffy nose. It can also help with drainage down the back of your throat.  ? An antihistamine can help with itching, sneezing, or runny nose.  ? An antihistamine eye drop can help with itchy eyes.  ? A decongestant can help with a stuffy nose. Talk with your doctor or pharmacist about your other health problems before you use this kind of medicine. It may not be safe for people with high blood pressure.  What follow-up care is needed?   Your doctor may ask you to make visits to the office to check on your progress. Your doctor may ask you to see an allergy specialist, or to have testing done to learn what is causing your allergies. Be sure to keep these visits.  What drugs may be needed?   The doctor may order drugs to treat the signs. Take your drugs as ordered. You may also take allergy shots if you have had allergy tests.  Will physical activity be limited?   You may have to limit your activity. If your health problem is caused by an allergy to pollens or molds, you may want to limit your time outdoors. Talk to your doctor about what activities are best for you.  What problems could happen?   · Your signs may not get better with your drugs  · Asthma may get worse  · Sinus infection  What can be done to prevent this health problem?   Try to avoid allergens.  · If you are allergic to pollens, grasses, trees, etc:  ? Stay inside in the morning when pollen counts are high.  ? Avoid going outside when the trees, grasses, or weeds are blooming.  ? Keep the windows of your house and car closed.  ? Use an air conditioner.  · If you are allergic to dust, molds, dust mites,  pets, etc:  ? Clean your air conditioner or furnace filters regularly.  ? Cover pillows and mattresses with vinyl covers to lower your exposure to dust mites.  ? Wash bedding weekly in very hot water.  ? Use fewer dust-collecting items, such as curtains, bed skirts, carpeting, and stuffed animals, mainly in your bedroom.  ? If you can't avoid having a furry pet, vacuum often and keep your pet out of bedrooms and other rooms with carpets.  When do I need to call the doctor?   · You are having so much trouble breathing that you can only say one or two words at a time.  · You need to sit upright at all times to be able to breathe and or cannot lie down.  · You have severe swelling of your tongue, lips, or mouth.  · You have trouble breathing when talking or sitting still.  · You have a fever of 100.4°F (38°C) or higher.  · You have green or yellow mucus.  Teach Back: Helping You Understand   The Teach Back Method helps you understand the information we are giving you. After you talk with the staff, tell them in your own words what you learned. This helps to make sure the staff has described each thing clearly. It also helps to explain things that may have been confusing. Before going home, make sure you can do these:  · I can tell you about my condition.  · I can tell you what may help make the air .  · I can tell you what may help ease my allergies.  Where can I learn more?   Food and Drug Administration  https://www.fda.gov/ForConsumers/ConsumerUpdates/lyn728530.htm   [facility] Choices  https://www.nhs.uk/conditions/Hay-fever/   Last Reviewed Date   2021-06-21  Consumer Information Use and Disclaimer   This information is not specific medical advice and does not replace information you receive from your health care provider. This is only a brief summary of general information. It does NOT include all information about conditions, illnesses, injuries, tests, procedures, treatments, therapies, discharge instructions  or life-style choices that may apply to you. You must talk with your health care provider for complete information about your health and treatment options. This information should not be used to decide whether or not to accept your health care providers advice, instructions or recommendations. Only your health care provider has the knowledge and training to provide advice that is right for you.  Copyright   Copyright © 2021 [employer] and its affiliates and/or licensors. All rights reserved.      Elevated Blood Pressure  Your blood pressure was elevated during your visit to the urgent care.  It was not so high that immediate care was needed but it is recommended that you monitor your blood pressure over the next week or two to make sure that it is not staying elevated.  Please have your blood pressure taken 2-3 times daily at different times of the day.  Write all of those blood pressures down and record the time that they were taken.  Keep all that information and take it with you to see your Primary Care Physician.  If your blood pressure is consistently above 140/90 you will need to follow up with your PCP more quickly.   It is important to minimize intake of salt, fried foods and caffeinated drinks. Also daily cardio(walking, running, treadmill, swimming, cycling) helps control blood pressure.     If you develop any chest pain, shortness of breadth, headache or weakness, immediately contact your doctor or go to ER.

## 2022-01-27 NOTE — PROGRESS NOTES
Subjective:       Patient ID: Zoraida Mendoza is a 41 y.o. female.    Vitals:  height is 5' (1.524 m) and weight is 59 kg (130 lb). Her temperature is 99.3 °F (37.4 °C). Her blood pressure is 151/82 (abnormal) and her pulse is 78. Her respiration is 15 and oxygen saturation is 100%.     Chief Complaint: Cough and Headache    Pt reports a dry cough that is worse at night. She has been in contact with someone who tested positive for COVID.  Also reports history of sinus allergies.  Blood pressure is noted to be mildly elevated patient reports history of borderline hypertension.   Deneis fever, chills, CP, SOB, HA, body aches, weakness/dizziness, N/V, diarrhea, abdominal pain, dysuria, loss of smell or taste.        Cough  This is a new problem. The current episode started in the past 7 days. The cough is non-productive. Associated symptoms include headaches. Pertinent negatives include no myalgias, nasal congestion, postnasal drip, rhinorrhea, sore throat or shortness of breath. The symptoms are aggravated by dust and cold air. She has tried nothing for the symptoms. The treatment provided no relief. There is no history of asthma.       HENT: Negative for postnasal drip and sore throat.    Respiratory: Positive for cough. Negative for shortness of breath.    Musculoskeletal: Negative for muscle ache.   Neurological: Positive for headaches.       Objective:      Physical Exam   Constitutional: She is oriented to person, place, and time. She appears well-developed and well-nourished. She is cooperative.  Non-toxic appearance. She does not appear ill. No distress.   HENT:   Head: Normocephalic and atraumatic.   Ears:   Right Ear: Hearing, tympanic membrane, external ear and ear canal normal. impacted cerumen  Left Ear: Hearing, tympanic membrane, external ear and ear canal normal. impacted cerumen  Nose: Congestion present. No mucosal edema, rhinorrhea or nasal deformity. No epistaxis. Right sinus exhibits no  maxillary sinus tenderness and no frontal sinus tenderness. Left sinus exhibits no maxillary sinus tenderness and no frontal sinus tenderness.   Mouth/Throat: Uvula is midline, oropharynx is clear and moist and mucous membranes are normal. No trismus in the jaw. Normal dentition. No uvula swelling. No oropharyngeal exudate or posterior oropharyngeal erythema.   Eyes: Conjunctivae and lids are normal. Right eye exhibits no discharge. Left eye exhibits no discharge. No scleral icterus.   Neck: Trachea normal and phonation normal. Neck supple.   Cardiovascular: Normal rate, regular rhythm, normal heart sounds, intact distal pulses and normal pulses.   No murmur heard.  Pulmonary/Chest: Effort normal and breath sounds normal. No stridor. No respiratory distress. She has no wheezes. She has no rhonchi. She has no rales.   Abdominal: Normal appearance and bowel sounds are normal. She exhibits no distension and no mass. Soft. There is no abdominal tenderness.   Musculoskeletal: Normal range of motion.         General: No deformity or edema. Normal range of motion.      Cervical back: She exhibits no tenderness.   Lymphadenopathy:     She has no cervical adenopathy.   Neurological: She is alert and oriented to person, place, and time. She exhibits normal muscle tone. Coordination normal.   Skin: Skin is warm, dry, intact, not diaphoretic and not pale.   Psychiatric: She has a normal mood and affect. Her speech is normal and behavior is normal. Judgment and thought content normal.   Nursing note and vitals reviewed.        Assessment:       1. Cough    2. Allergic rhinitis, unspecified seasonality, unspecified trigger    3. Acute URI    4. Primary hypertension          Plan:         Cough  -     POCT COVID-19 Rapid Screening    Allergic rhinitis, unspecified seasonality, unspecified trigger    Acute URI    Primary hypertension    Other orders  -     benzonatate (TESSALON PERLES) 100 MG capsule; Take 2 capsules (200 mg total)  by mouth 3 (three) times daily as needed for Cough.  Dispense: 20 capsule; Refill: 0        Patient Education       Cough Discharge Instructions, Adult   About this topic   Many things can cause a cough. A cold or allergies can cause a cough. Other times, asthma or smoking can cause your cough. You can have a cough from mucus that drips down the back of your throat or from stomach acid that backs up into your throat. Sometimes a cough is a side effect from a drug. You may be waiting on some test results. If so, the staff will contact you if there are concerning results.  What care is needed at home?   · Ask your doctor what you need to do when you go home. Make sure you ask questions if you do not understand what the doctor says.  · To help you feel better:  ? Use a cool mist humidifier to avoid breathing dry air.  ? Use hard candy or cough drops to soothe sore throat and cough.  ? Gargle with salt water (mix 1/2 teaspoon salt with 1 cup warm water) a few times a day.  ? Spray saltwater mist in each nostril. Any normal saline spray works.  ? Sip warm liquids to keep your throat moist.  ? Take warm, steamy showers to help soothe the cough.  · Do not smoke or be in smoke-filled places. Avoid things that may cause breathing problems like vaping, fumes, pollution, dust, and other common allergens.  · You may want to use over-the-counter medicines for allergies or acid reflux if your cough is due to one of these problems.  · You can also use an over-the-counter cough medicine.  What follow-up care is needed?   Your doctor may ask you to make visits to the office to check on your progress. Be sure to keep these visits.  What drugs may be needed?   The doctor may order drugs to:  · Control coughing  · Get rid of or thin mucus  · Block allergens if your cough is due to allergies  · Increase airflow if your cough is due to asthma or COPD  · Reduce swelling of the airways  · Reduce stomach acid if your cough is due to stomach  acid problems  Will physical activity be limited?   Your cough may interfere with some of your activities.  What changes to diet are needed?   Some people feel milk products worsen their sputum production and worsen their cough. There is no proof that this is true. There is no need to reduce milk intake. Honey has been shown to be as effective as the leading over-the-counter (OTC) drug for calming coughs. Use 1/2 to 1 teaspoon (2.5 mL to 5 mL) of honey as needed. It can thin the secretions and loosen the cough. Never give honey to a child under the age of 1.  What can be done to prevent this health problem?   · Wash your hands often with soap and water for at least 20 seconds, especially after coughing or sneezing. Alcohol-based hand sanitizers also work to kill the virus.       · If you are sick, cover your mouth and nose with tissue when you cough or sneeze. You can also cough into your elbow. Throw away tissues in the trash and wash your hands after touching used tissues.  · Clean items and surfaces you most often touch like door handles, remotes, phones, or toys. Wipe them with a disinfectant. This can help reduce the spread of infection.  · Do not get too close (kissing, hugging) to people who are sick.  · Do not share towels or hankies with anyone who is sick.  · Stay away from crowded places.  · Get a flu shot each year.     When do I need to call the doctor?   · You have chest pain when you cough or trouble breathing.  · You start to cough up blood or yellow or green mucus.  · You have a fever of 100.4°F (38°C) or higher or chills.  · You cough so hard you throw up.  · You are still coughing in 10 days.  Teach Back: Helping You Understand   The Teach Back Method helps you understand the information we are giving you. After you talk with the staff, tell them in your own words what you learned. This helps to make sure the staff has described each thing clearly. It also helps to explain things that may have  been confusing. Before going home, make sure you can do these:  · I can tell you about my condition.  · I can tell you what I can do to help thin the mucus and ease my cough.  · I can tell you what I will do if I have wheezing, chest tightness, my lips turn blue with coughing, or I have other trouble breathing.  Where can I learn more?   American Academy of Family [employer]icians  https://familydoctor.org/cough-medicine-understanding-your-otc-options/   [facility] Choices  https://www.nhs.uk/conditions/cough/   Last Reviewed Date   2021-06-10  Consumer Information Use and Disclaimer   This information is not specific medical advice and does not replace information you receive from your health care provider. This is only a brief summary of general information. It does NOT include all information about conditions, illnesses, injuries, tests, procedures, treatments, therapies, discharge instructions or life-style choices that may apply to you. You must talk with your health care provider for complete information about your health and treatment options. This information should not be used to decide whether or not to accept your health care providers advice, instructions or recommendations. Only your health care provider has the knowledge and training to provide advice that is right for you.  Copyright   Copyright © 2021 [employer] and its affiliates and/or licensors. All rights reserved.  Patient Education       High Blood Pressure Discharge Instructions   About this topic   The medical name for high blood pressure is hypertension. Your blood pressure is measured with 2 numbers. For example, you may hear the staff say your blood pressure is 130 over 80. High blood pressure cannot be cured. You must control it with drugs and lifestyle changes. High blood pressure puts you at risk for heart attack, stroke, and kidney disease.         What care is needed at home?   · Ask your doctor what you need to do when you go home. Make sure  you ask questions if you do not understand what the doctor says. This way you will know what you need to do.  · Take all your medicines as ordered. Do not stop taking any of your regular medicines without talking to your doctor.  · Learn how to check your blood pressure at home, if your doctor suggests you do this.  What follow-up care is needed?   Your doctor may ask you to make visits to the office to check on your progress. Be sure to keep these visits.  What drugs may be needed?   The doctor may order drugs to help control your high blood pressure. Take your drugs as ordered. Do not take other prescription drugs or over-the-counter (OTC) drugs without talking to your doctor first.  Will physical activity be limited?   Talk to your doctor about the right amount of activity for you. Exercise may help you lose weight and lower your blood pressure.  What changes to diet are needed?   · Do not use salt on your food. Use herbs and spices to improve the taste.  · Eat less than 1,500 mg of sodium a day. Read food labels to see how much sodium is in a food.  · Limit coffee, tea, and soda to 2 cups (480 mL) a day.  · Limit beer, wine, and mixed drinks (alcohol) to 1 drink a day for women and 2 drinks a day for men.  · Eat lots of fruits, vegetables, and low-fat dairy products.  · Avoid fatty foods like fried foods or chips.  · Talk with your dietitian about the diet changes you need and the number of calories you should eat each day.  What problems could happen?   · Heart attack, heart failure, or other heart problems  · Stroke  · Swelling of blood vessels  · Kidney failure  · Loss of eyesight  · Memory problems  · Fluid in the lungs  · Death  What can be done to prevent this health problem?   · Exercise regularly. Try to get at least 30 minutes of exercise most days of the week.  · Avoid weight gain.  · Stop smoking. Your doctor can tell you about stop smoking programs.  · Learn to lower stress.  When do I need to call  the doctor?   · You have signs of a heart attack, which may include:  ? Severe chest pain, pressure, or discomfort with:  § Breathing trouble, sweating, upset stomach, or cold, clammy skin  § Pain in your arms, back, or jaw  § Worse pain with activity like walking up stairs  ? Fast or irregular heartbeat  ? Feeling dizzy, faint, or weak  · You have signs of stroke like sudden:  ? Numbness or weakness of the face, arm, or leg, especially on one side of the body  ? Confusion, trouble speaking, or understanding  ? Trouble seeing in one or both eyes  ? Trouble walking, dizziness, loss of balance, or coordination  ? Severe headache with no known cause  · You have a seizure or pass out.  · You have a severe headache with an upset stomach or throwing up.  · You have sudden, severe back pain.  · You have 2 home blood pressure readings higher than 180/120.  · Your urine is brown or bloody.  Teach Back: Helping You Understand   The Teach Back Method helps you understand the information we are giving you. After you talk with the staff, tell them in your own words what you learned. This helps to make sure the staff has described each thing clearly. It also helps to explain things that may have been confusing. Before going home, make sure you can do these:  · I can tell you about my condition.  · I can tell you what numbers are too high for my blood pressure.  · I can tell you what I will do if I have signs of a heart attack or stroke.  Where can I learn more?   American Academy of Family Physicians  https://familydoctor.org/condition/high-blood-pressure/   American Academy of Family Physicians  https://familydoctor.org/the-dash-diet-healthy-eating-to-control-your-blood-pressure/   American Heart Association  http://www.heart.org/HEARTORG/Conditions/HighBloodPressure/AboutHighBloodPressure/About-High-Blood-Pressure_UC_002050_Article.jsp#.[email or web address]   [facility]  Choices  http://www.nhs.uk/conditions/blood-pressure-(high)/pages/introduction.aspx   [employer]  https://www.uptodate.com/contents/high-blood-pressure-in-adults-beyond-the-basics?source=see_link   Last Reviewed Date   2021-06-08  Consumer Information Use and Disclaimer   This information is not specific medical advice and does not replace information you receive from your health care provider. This is only a brief summary of general information. It does NOT include all information about conditions, illnesses, injuries, tests, procedures, treatments, therapies, discharge instructions or life-style choices that may apply to you. You must talk with your health care provider for complete information about your health and treatment options. This information should not be used to decide whether or not to accept your health care providers advice, instructions or recommendations. Only your health care provider has the knowledge and training to provide advice that is right for you.  Copyright   Copyright © 2021 [employer] and its affiliates and/or licensors. All rights reserved.  Patient Education       Seasonal Allergies Discharge Instructions   About this topic   Seasonal allergies are also called allergic rhinitis or hay fever. You may have sneezing; a stuffy or runny nose; or itchy throat, ears, or eyes. You may feel very tired. Most often, this problem is caused by:  · Pollens from trees, grasses, or weeds.  · Mold spores that grow when the air is humid, wet, or damp.  Some people can breathe in these things and have no problems. But when you have a seasonal allergy, your body acts as if the substance is harming you. Then you have symptoms. You may have symptoms only at some times of the year. If your symptoms last all year, they may be caused by:  · Insects, such as dust mites or cock roaches.  · Animals, such as cats or dogs.  · Mold spores.     What care is needed at home?   · Ask your doctor what you need to do  when you go home. Make sure you ask questions if you do not understand what the doctor says.  · Rinse your nose with salt water to get rid of pollen inside of your nose.  · Keep the windows closed and use air conditioning.  · Shower before bed to rinse pollen from your hair and skin.  · Wear a dust mask if you need to be outside.  · Use over-the-counter medicines to help with your symptoms:  ? A steroid nose spray can help with a stuffy nose. It can also help with drainage down the back of your throat.  ? An antihistamine can help with itching, sneezing, or runny nose.  ? An antihistamine eye drop can help with itchy eyes.  ? A decongestant can help with a stuffy nose. Talk with your doctor or pharmacist about your other health problems before you use this kind of medicine. It may not be safe for people with high blood pressure.  What follow-up care is needed?   Your doctor may ask you to make visits to the office to check on your progress. Your doctor may ask you to see an allergy specialist, or to have testing done to learn what is causing your allergies. Be sure to keep these visits.  What drugs may be needed?   The doctor may order drugs to treat the signs. Take your drugs as ordered. You may also take allergy shots if you have had allergy tests.  Will physical activity be limited?   You may have to limit your activity. If your health problem is caused by an allergy to pollens or molds, you may want to limit your time outdoors. Talk to your doctor about what activities are best for you.  What problems could happen?   · Your signs may not get better with your drugs  · Asthma may get worse  · Sinus infection  What can be done to prevent this health problem?   Try to avoid allergens.  · If you are allergic to pollens, grasses, trees, etc:  ? Stay inside in the morning when pollen counts are high.  ? Avoid going outside when the trees, grasses, or weeds are blooming.  ? Keep the windows of your house and car  closed.  ? Use an air conditioner.  · If you are allergic to dust, molds, dust mites, pets, etc:  ? Clean your air conditioner or furnace filters regularly.  ? Cover pillows and mattresses with vinyl covers to lower your exposure to dust mites.  ? Wash bedding weekly in very hot water.  ? Use fewer dust-collecting items, such as curtains, bed skirts, carpeting, and stuffed animals, mainly in your bedroom.  ? If you can't avoid having a furry pet, vacuum often and keep your pet out of bedrooms and other rooms with carpets.  When do I need to call the doctor?   · You are having so much trouble breathing that you can only say one or two words at a time.  · You need to sit upright at all times to be able to breathe and or cannot lie down.  · You have severe swelling of your tongue, lips, or mouth.  · You have trouble breathing when talking or sitting still.  · You have a fever of 100.4°F (38°C) or higher.  · You have green or yellow mucus.  Teach Back: Helping You Understand   The Teach Back Method helps you understand the information we are giving you. After you talk with the staff, tell them in your own words what you learned. This helps to make sure the staff has described each thing clearly. It also helps to explain things that may have been confusing. Before going home, make sure you can do these:  · I can tell you about my condition.  · I can tell you what may help make the air .  · I can tell you what may help ease my allergies.  Where can I learn more?   Food and Drug Administration  https://www.fda.gov/ForConsumers/ConsumerUpdates/txc079126.htm   [facility] Choices  https://www.nhs.uk/conditions/Hay-fever/   Last Reviewed Date   2021-06-21  Consumer Information Use and Disclaimer   This information is not specific medical advice and does not replace information you receive from your health care provider. This is only a brief summary of general information. It does NOT include all information about conditions,  illnesses, injuries, tests, procedures, treatments, therapies, discharge instructions or life-style choices that may apply to you. You must talk with your health care provider for complete information about your health and treatment options. This information should not be used to decide whether or not to accept your health care providers advice, instructions or recommendations. Only your health care provider has the knowledge and training to provide advice that is right for you.  Copyright   Copyright © 2021 [employer], [employer] and its affiliates and/or licensors. All rights reserved.      Elevated Blood Pressure  Your blood pressure was elevated during your visit to the urgent care.  It was not so high that immediate care was needed but it is recommended that you monitor your blood pressure over the next week or two to make sure that it is not staying elevated.  Please have your blood pressure taken 2-3 times daily at different times of the day.  Write all of those blood pressures down and record the time that they were taken.  Keep all that information and take it with you to see your Primary Care Physician.  If your blood pressure is consistently above 140/90 you will need to follow up with your PCP more quickly.   It is important to minimize intake of salt, fried foods and caffeinated drinks. Also daily cardio(walking, running, treadmill, swimming, cycling) helps control blood pressure.     If you develop any chest pain, shortness of breadth, headache or weakness, immediately contact your doctor or go to ER.

## 2022-06-14 ENCOUNTER — OFFICE VISIT (OUTPATIENT)
Dept: URGENT CARE | Facility: CLINIC | Age: 42
End: 2022-06-14
Payer: COMMERCIAL

## 2022-06-14 VITALS
HEART RATE: 82 BPM | TEMPERATURE: 99 F | DIASTOLIC BLOOD PRESSURE: 101 MMHG | WEIGHT: 130 LBS | HEIGHT: 60 IN | BODY MASS INDEX: 25.52 KG/M2 | SYSTOLIC BLOOD PRESSURE: 146 MMHG | RESPIRATION RATE: 20 BRPM | OXYGEN SATURATION: 98 %

## 2022-06-14 DIAGNOSIS — R03.0 ELEVATED BLOOD PRESSURE READING: ICD-10-CM

## 2022-06-14 DIAGNOSIS — R09.81 SINUS CONGESTION: ICD-10-CM

## 2022-06-14 DIAGNOSIS — J06.9 VIRAL URI WITH COUGH: Primary | ICD-10-CM

## 2022-06-14 LAB
CTP QC/QA: YES
SARS-COV-2 RDRP RESP QL NAA+PROBE: NEGATIVE

## 2022-06-14 PROCEDURE — 99213 PR OFFICE/OUTPT VISIT, EST, LEVL III, 20-29 MIN: ICD-10-PCS | Mod: S$GLB,,, | Performed by: NURSE PRACTITIONER

## 2022-06-14 PROCEDURE — 3008F PR BODY MASS INDEX (BMI) DOCUMENTED: ICD-10-PCS | Mod: CPTII,S$GLB,, | Performed by: NURSE PRACTITIONER

## 2022-06-14 PROCEDURE — 3080F DIAST BP >= 90 MM HG: CPT | Mod: CPTII,S$GLB,, | Performed by: NURSE PRACTITIONER

## 2022-06-14 PROCEDURE — 3077F PR MOST RECENT SYSTOLIC BLOOD PRESSURE >= 140 MM HG: ICD-10-PCS | Mod: CPTII,S$GLB,, | Performed by: NURSE PRACTITIONER

## 2022-06-14 PROCEDURE — 3080F PR MOST RECENT DIASTOLIC BLOOD PRESSURE >= 90 MM HG: ICD-10-PCS | Mod: CPTII,S$GLB,, | Performed by: NURSE PRACTITIONER

## 2022-06-14 PROCEDURE — 3008F BODY MASS INDEX DOCD: CPT | Mod: CPTII,S$GLB,, | Performed by: NURSE PRACTITIONER

## 2022-06-14 PROCEDURE — U0002 COVID-19 LAB TEST NON-CDC: HCPCS | Mod: QW,S$GLB,, | Performed by: NURSE PRACTITIONER

## 2022-06-14 PROCEDURE — 1159F PR MEDICATION LIST DOCUMENTED IN MEDICAL RECORD: ICD-10-PCS | Mod: CPTII,S$GLB,, | Performed by: NURSE PRACTITIONER

## 2022-06-14 PROCEDURE — U0002: ICD-10-PCS | Mod: QW,S$GLB,, | Performed by: NURSE PRACTITIONER

## 2022-06-14 PROCEDURE — 99213 OFFICE O/P EST LOW 20 MIN: CPT | Mod: S$GLB,,, | Performed by: NURSE PRACTITIONER

## 2022-06-14 PROCEDURE — 3077F SYST BP >= 140 MM HG: CPT | Mod: CPTII,S$GLB,, | Performed by: NURSE PRACTITIONER

## 2022-06-14 PROCEDURE — 1159F MED LIST DOCD IN RCRD: CPT | Mod: CPTII,S$GLB,, | Performed by: NURSE PRACTITIONER

## 2022-06-14 RX ORDER — CODEINE PHOSPHATE AND GUAIFENESIN 10; 100 MG/5ML; MG/5ML
5 SOLUTION ORAL 3 TIMES DAILY PRN
Qty: 105 ML | Refills: 0 | Status: SHIPPED | OUTPATIENT
Start: 2022-06-14 | End: 2022-06-21

## 2022-06-14 RX ORDER — FLUTICASONE PROPIONATE 50 MCG
1-2 SPRAY, SUSPENSION (ML) NASAL DAILY
Qty: 18.2 ML | Refills: 0 | Status: SHIPPED | OUTPATIENT
Start: 2022-06-14 | End: 2022-06-14

## 2022-06-14 NOTE — PROGRESS NOTES
Subjective:       Patient ID: Zoraida Mendoza is a 41 y.o. female.    Vitals:  height is 5' (1.524 m) and weight is 59 kg (130 lb). Her temperature is 99.3 °F (37.4 °C). Her blood pressure is 154/101 (abnormal) and her pulse is 82. Her respiration is 20 and oxygen saturation is 98%.     Chief Complaint: Cough and Sinus Problem    Sinus Problem  This is a new problem. The current episode started in the past 7 days (STARTED LAST WEEK ). The problem is unchanged. There has been no fever. Her pain is at a severity of 2/10. The pain is mild. Associated symptoms include congestion, coughing, headaches, sinus pressure and sneezing. Pertinent negatives include no diaphoresis, ear pain, sore throat or swollen glands. Past treatments include oral decongestants. The treatment provided mild relief.       Constitution: Negative for sweating.   HENT: Positive for congestion and sinus pressure. Negative for ear pain and sore throat.    Respiratory: Positive for cough.    Allergic/Immunologic: Positive for sneezing.   Neurological: Positive for headaches.       Objective:      Physical Exam   Constitutional: She is oriented to person, place, and time. She appears well-developed. She is cooperative.  Non-toxic appearance. She does not appear ill. No distress.      Comments:Appears well   afebrile     HENT:   Head: Normocephalic and atraumatic.   Ears:   Right Ear: Hearing, tympanic membrane, external ear and ear canal normal.   Left Ear: Hearing, tympanic membrane, external ear and ear canal normal.   Nose: Nose normal. No mucosal edema, rhinorrhea or nasal deformity. No epistaxis. Right sinus exhibits no maxillary sinus tenderness and no frontal sinus tenderness. Left sinus exhibits no maxillary sinus tenderness and no frontal sinus tenderness.   Mouth/Throat: Uvula is midline, oropharynx is clear and moist and mucous membranes are normal. No trismus in the jaw. Normal dentition. No uvula swelling. No posterior oropharyngeal  erythema.   PND      Comments: PND  Eyes: Conjunctivae and lids are normal. Right eye exhibits no discharge. Left eye exhibits no discharge. No scleral icterus.   Neck: Trachea normal and phonation normal. Neck supple.   Cardiovascular: Normal rate, regular rhythm, normal heart sounds and normal pulses.   Pulmonary/Chest: Effort normal and breath sounds normal. No respiratory distress.   Lungs clear bilaterally          Comments: Lungs clear bilaterally     Abdominal: Normal appearance.   Musculoskeletal: Normal range of motion.         General: No deformity. Normal range of motion.   Neurological: She is alert and oriented to person, place, and time. She exhibits normal muscle tone. Coordination normal.   Skin: Skin is warm, dry, intact, not diaphoretic and not pale.   Psychiatric: Her speech is normal and behavior is normal. Judgment and thought content normal.   Nursing note and vitals reviewed.        Results for orders placed or performed in visit on 06/14/22   POCT COVID-19 Rapid Screening   Result Value Ref Range    POC Rapid COVID Negative Negative     Acceptable Yes      Assessment:       1. Viral URI with cough    2. Sinus congestion          Plan:         Viral URI with cough  -     POCT COVID-19 Rapid Screening    Sinus congestion  -     fluticasone propionate (FLONASE) 50 mcg/actuation nasal spray; 1-2 sprays ( mcg total) by Each Nostril route once daily.  Dispense: 18.2 mL; Refill: 0    Other orders  -     guaiFENesin-codeine 100-10 mg/5 ml (TUSSI-ORGANIDIN NR)  mg/5 mL syrup; Take 5 mLs by mouth 3 (three) times daily as needed for Cough.  Dispense: 105 mL; Refill: 0      Patient Instructions   Use an antihistamine such as Claritin, Zyrtec or Allegra to dry you out.     Use Flonase 1-2 sprays/nostril per day. It is a local acting steroid nasal spray, if you develop a bloody nose, stop using the medication immediately.    Use warm salt water gargles to ease your throat pain.  Hot tea with honey.     Sometimes Nyquil at night is beneficial to help you get some rest, however it is sedating and it does have an antihistamine, and tylenol.     A cool mist humidifier in bedroom may help with cough and relieve stuffy nose.     Over the counter you can use Tylenol (acetominophen) or Ibuprofen for your minor aches and pains as long as you have no contraindications.    Good nutrition. Lots of rest. Plenty of fluids     You must understand that you've received an Urgent Care treatment only and that you may be released before all your medical problems are known or treated. You, the patient, will arrange for follow up care as instructed.  Follow up with your PCP or specialty clinic as directed in the next 1-2 weeks if not improved or as needed.  You can call [phone] to schedule an appointment with the appropriate provider.  If your condition worsens we recommend that you receive another evaluation at the emergency room immediately or contact your primary medical clinics after hours call service to discuss your concerns.  Please return here or go to the Emergency Department for any concerns or worsening of condition.     Your test was NEGATIVE for COVID-19 (coronavirus).      You may leave home and/or return to work when the following conditions are met:  · 24 hours fever free without fever-reducing medications AND  · Improved symptoms  · You are fully vaccinated or have not had close contact with someone with COVID-19 (within 6 feet for 15 minutes or more)    If you are fully vaccinated and had a close contact, there is no need for quarantine, unless you develop symptoms.      If you are not fully vaccinated and had a close contact:  · Retest at 5 to 7 days post-exposure  · If possible, it is recommended that you quarantine for 5 days from the time of contact regardless of your test status.  · A mask should be worn indoors post quarantine.    If your symptoms worsen or if you have any other  concerns, please contact [facility] at [phone].     Sincerely,    My Talbert NP

## 2022-06-14 NOTE — PATIENT INSTRUCTIONS
Use an antihistamine such as Claritin, Zyrtec or Allegra to dry you out.     Use Flonase 1-2 sprays/nostril per day. It is a local acting steroid nasal spray, if you develop a bloody nose, stop using the medication immediately.    Use warm salt water gargles to ease your throat pain. Hot tea with honey.     Take cough syrup nightly. Cough syrup contains codeine will make drowsy do not take while driving    A cool mist humidifier in bedroom may help with cough and relieve stuffy nose.     Please be aware your blood pressure was slightly elevated today -  Make sure to take your blood pressure medicines, eat a low salt diet and recheck your blood pressure to make sure it is not getting too elevated ( greater than 160/100). Also make sure to let your doctor know about the elevated reading.     Over the counter you can use Tylenol (acetominophen) or Ibuprofen for your minor aches and pains as long as you have no contraindications.    Good nutrition. Lots of rest. Plenty of fluids     You must understand that you've received an Urgent Care treatment only and that you may be released before all your medical problems are known or treated. You, the patient, will arrange for follow up care as instructed.  Follow up with your PCP or specialty clinic as directed in the next 1-2 weeks if not improved or as needed.  You can call [phone] to schedule an appointment with the appropriate provider.  If your condition worsens we recommend that you receive another evaluation at the emergency room immediately or contact your primary medical clinics after hours call service to discuss your concerns.  Please return here or go to the Emergency Department for any concerns or worsening of condition.     Your test was NEGATIVE for COVID-19 (coronavirus).      You may leave home and/or return to work when the following conditions are met:  24 hours fever free without fever-reducing medications AND  Improved symptoms  You are fully  vaccinated or have not had close contact with someone with COVID-19 (within 6 feet for 15 minutes or more)    If you are fully vaccinated and had a close contact, there is no need for quarantine, unless you develop symptoms.      If you are not fully vaccinated and had a close contact:  Retest at 5 to 7 days post-exposure  If possible, it is recommended that you quarantine for 5 days from the time of contact regardless of your test status.  A mask should be worn indoors post quarantine.    If your symptoms worsen or if you have any other concerns, please contact [facility] at [phone].     Sincerely,    My Talbert NP

## 2024-02-29 ENCOUNTER — TELEPHONE (OUTPATIENT)
Dept: FAMILY MEDICINE | Facility: CLINIC | Age: 44
End: 2024-02-29
Payer: COMMERCIAL

## 2024-03-02 ENCOUNTER — PATIENT MESSAGE (OUTPATIENT)
Dept: FAMILY MEDICINE | Facility: CLINIC | Age: 44
End: 2024-03-02
Payer: COMMERCIAL

## 2024-03-13 ENCOUNTER — HOSPITAL ENCOUNTER (OUTPATIENT)
Dept: RADIOLOGY | Facility: OTHER | Age: 44
Discharge: HOME | End: 2024-03-13
Attending: FAMILY MEDICINE
Payer: COMMERCIAL

## 2024-03-13 DIAGNOSIS — Z12.31 ENCOUNTER FOR SCREENING MAMMOGRAM FOR BREAST CANCER: ICD-10-CM

## 2024-03-13 PROCEDURE — 77067 SCR MAMMO BI INCL CAD: CPT | Mod: TC

## 2024-03-13 PROCEDURE — 77063 BREAST TOMOSYNTHESIS BI: CPT | Mod: 26,,, | Performed by: RADIOLOGY

## 2024-03-13 PROCEDURE — 77067 SCR MAMMO BI INCL CAD: CPT | Mod: 26,,, | Performed by: RADIOLOGY

## 2024-03-18 ENCOUNTER — OFFICE VISIT (OUTPATIENT)
Dept: OBSTETRICS AND GYNECOLOGY | Facility: CLINIC | Age: 44
End: 2024-03-18
Payer: COMMERCIAL

## 2024-03-18 ENCOUNTER — LAB VISIT (OUTPATIENT)
Dept: LAB | Facility: HOSPITAL | Age: 44
End: 2024-03-18
Attending: STUDENT IN AN ORGANIZED HEALTH CARE EDUCATION/TRAINING PROGRAM
Payer: COMMERCIAL

## 2024-03-18 VITALS
BODY MASS INDEX: 29.32 KG/M2 | WEIGHT: 150.13 LBS | DIASTOLIC BLOOD PRESSURE: 100 MMHG | SYSTOLIC BLOOD PRESSURE: 146 MMHG

## 2024-03-18 DIAGNOSIS — Z01.419 WELL WOMAN EXAM: Primary | ICD-10-CM

## 2024-03-18 DIAGNOSIS — N94.10 DYSPAREUNIA IN FEMALE: ICD-10-CM

## 2024-03-18 DIAGNOSIS — Z01.419 WELL WOMAN EXAM: ICD-10-CM

## 2024-03-18 LAB
HAV IGM SERPL QL IA: NORMAL
HBV CORE IGM SERPL QL IA: NORMAL
HBV SURFACE AG SERPL QL IA: NORMAL
HCV AB SERPL QL IA: NORMAL
HIV 1+2 AB+HIV1 P24 AG SERPL QL IA: NORMAL

## 2024-03-18 PROCEDURE — 99386 PREV VISIT NEW AGE 40-64: CPT | Mod: S$GLB,,, | Performed by: STUDENT IN AN ORGANIZED HEALTH CARE EDUCATION/TRAINING PROGRAM

## 2024-03-18 PROCEDURE — 99999 PR PBB SHADOW E&M-EST. PATIENT-LVL III: CPT | Mod: PBBFAC,,, | Performed by: STUDENT IN AN ORGANIZED HEALTH CARE EDUCATION/TRAINING PROGRAM

## 2024-03-18 PROCEDURE — 1159F MED LIST DOCD IN RCRD: CPT | Mod: CPTII,S$GLB,, | Performed by: STUDENT IN AN ORGANIZED HEALTH CARE EDUCATION/TRAINING PROGRAM

## 2024-03-18 PROCEDURE — 87389 HIV-1 AG W/HIV-1&-2 AB AG IA: CPT | Performed by: STUDENT IN AN ORGANIZED HEALTH CARE EDUCATION/TRAINING PROGRAM

## 2024-03-18 PROCEDURE — 81514 NFCT DS BV&VAGINITIS DNA ALG: CPT | Performed by: STUDENT IN AN ORGANIZED HEALTH CARE EDUCATION/TRAINING PROGRAM

## 2024-03-18 PROCEDURE — 86592 SYPHILIS TEST NON-TREP QUAL: CPT | Performed by: STUDENT IN AN ORGANIZED HEALTH CARE EDUCATION/TRAINING PROGRAM

## 2024-03-18 PROCEDURE — 3080F DIAST BP >= 90 MM HG: CPT | Mod: CPTII,S$GLB,, | Performed by: STUDENT IN AN ORGANIZED HEALTH CARE EDUCATION/TRAINING PROGRAM

## 2024-03-18 PROCEDURE — 80074 ACUTE HEPATITIS PANEL: CPT | Performed by: STUDENT IN AN ORGANIZED HEALTH CARE EDUCATION/TRAINING PROGRAM

## 2024-03-18 PROCEDURE — 36415 COLL VENOUS BLD VENIPUNCTURE: CPT | Performed by: STUDENT IN AN ORGANIZED HEALTH CARE EDUCATION/TRAINING PROGRAM

## 2024-03-18 PROCEDURE — 3077F SYST BP >= 140 MM HG: CPT | Mod: CPTII,S$GLB,, | Performed by: STUDENT IN AN ORGANIZED HEALTH CARE EDUCATION/TRAINING PROGRAM

## 2024-03-18 PROCEDURE — 1160F RVW MEDS BY RX/DR IN RCRD: CPT | Mod: CPTII,S$GLB,, | Performed by: STUDENT IN AN ORGANIZED HEALTH CARE EDUCATION/TRAINING PROGRAM

## 2024-03-18 PROCEDURE — 3008F BODY MASS INDEX DOCD: CPT | Mod: CPTII,S$GLB,, | Performed by: STUDENT IN AN ORGANIZED HEALTH CARE EDUCATION/TRAINING PROGRAM

## 2024-03-18 PROCEDURE — 87491 CHLMYD TRACH DNA AMP PROBE: CPT | Performed by: STUDENT IN AN ORGANIZED HEALTH CARE EDUCATION/TRAINING PROGRAM

## 2024-03-18 NOTE — PROGRESS NOTES
"History & Physical  Gynecology      SUBJECTIVE:     Chief Complaint: Well Woman       History of Present Illness:  43 y.o. female  here for annual GYN visit.   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    She denies vaginal itching, irritation, or discharge.  Patient is sexually active with 1 male partner and endorses intermittent pain during sex. Pain is "on the inside" and worse when she is on top.  She does not use tobacco, and denies clinically significant alcohol or drug use.   She works social security claims. She live with her aunt, and reports feeling safe at home.     Patient denies a history of abnormal paps  Last MMG: 3/2023 - BIRDADS - 1    She denies a family history of breast or ovarian cancer.       Review of patient's allergies indicates:   Allergen Reactions    Penicillins Hives       History reviewed. No pertinent past medical history.  Past Surgical History:   Procedure Laterality Date    BREAST SURGERY      HYSTERECTOMY       OB History          0    Para   0    Term   0       0    AB   0    Living   0         SAB   0    IAB   0    Ectopic   0    Multiple   0    Live Births   0               History reviewed. No pertinent family history.  Social History     Tobacco Use    Smoking status: Never    Smokeless tobacco: Never   Substance Use Topics    Alcohol use: Yes    Drug use: No       Current Outpatient Medications   Medication Sig    acyclovir (ZOVIRAX) 400 MG tablet Take 1 tablet (400 mg total) by mouth 3 (three) times daily.    albuterol 90 mcg/actuation inhaler Inhale 1-2 puffs into the lungs every 6 (six) hours as needed for Wheezing. Rescue    benzonatate (TESSALON PERLES) 100 MG capsule Take 2 capsules (200 mg total) by mouth 3 (three) times daily as needed for Cough. (Patient not taking: Reported on 2022)    fluticasone propionate (FLONASE) 50 mcg/actuation nasal spray SHAKE LIQUID AND USE 2 SPRAYS(100 MCG) IN EACH NOSTRIL EVERY " DAY FOR 7 DAYS (Patient not taking: No sig reported)    fluticasone propionate (FLONASE) 50 mcg/actuation nasal spray SHAKE LIQUID AND USE 1 TO 2 SPRAYS(50  MCG) IN EACH NOSTRIL EVERY DAY (Patient not taking: Reported on 3/18/2024)    omeprazole (PRILOSEC) 40 MG capsule Take 1 capsule (40 mg total) by mouth once daily.    valACYclovir (VALTREX) 1000 MG tablet Take 2 tablets (2,000 mg total) by mouth every 12 (twelve) hours. for 1 day     No current facility-administered medications for this visit.       Review of Systems:  Review of Systems   Constitutional:  Negative for chills, fatigue and fever.   HENT:  Negative for congestion.    Eyes:  Negative for visual disturbance.   Respiratory:  Negative for cough and shortness of breath.    Cardiovascular:  Negative for chest pain and palpitations.   Gastrointestinal:  Negative for abdominal distention, abdominal pain, constipation, diarrhea, nausea and vomiting.   Genitourinary:  Positive for dyspareunia. Negative for difficulty urinating, dysuria, hematuria, vaginal bleeding and vaginal discharge.   Skin:  Negative for rash.   Neurological:  Negative for dizziness, seizures, light-headedness and headaches.   Hematological:  Does not bruise/bleed easily.   Psychiatric/Behavioral:  Negative for dysphoric mood. The patient is not nervous/anxious.         OBJECTIVE:     Physical Exam:  Vitals:    03/18/24 0815   BP: (!) 146/100      Physical Exam  Vitals and nursing note reviewed. Exam conducted with a chaperone present.   Constitutional:       General: She is not in acute distress.     Appearance: She is well-developed.   HENT:      Head: Normocephalic and atraumatic.   Eyes:      Pupils: Pupils are equal, round, and reactive to light.   Cardiovascular:      Rate and Rhythm: Normal rate and regular rhythm.   Pulmonary:      Effort: Pulmonary effort is normal. No respiratory distress.   Chest:   Breasts:     Right: Normal.      Left: Normal.       Abdominal:       General: There is no distension.      Palpations: Abdomen is soft. There is no mass.      Tenderness: There is no abdominal tenderness. There is no guarding.   Genitourinary:     Comments: SSE: Normal external female genitalia, normal urethral meatus, normal vaginal rugae, normal vaginal mucosa, no vaginal blood in canal, normal physiologic discharge, surgically absent cervix and uterus, no adnexal masses palpated on bimanual exam.   Musculoskeletal:         General: Normal range of motion.      Cervical back: Normal range of motion and neck supple.   Skin:     General: Skin is warm and dry.   Neurological:      Mental Status: She is alert and oriented to person, place, and time.   Psychiatric:         Behavior: Behavior normal.         Thought Content: Thought content normal.         Judgment: Judgment normal.     ASSESSMENT/PLAN:     1. Well woman exam  - Pap smear - No longer indicated  - Mammogram - Up to date  - Colonoscopy - Due  - Calcium and Vitamin D counseling  14 - 30 years old 1,300mg Ca/d; 600 IU vit D/d  31 - 50 years old 1,000 Ca/d; 600 IU vit D/d  51 - 70 year old: 1,200 Ca/d; 600 IU vit D/d  71+ years old 1,200 Ca/d; 800 IU vit D/d    2. Dyspareunia in female  - Reassuring clinical exam  - Will get pelvic imaging to rule out occult abnormality    Will Wade M.D.  OB/GYN  [facility]

## 2024-03-19 LAB — RPR SER QL: NORMAL

## 2024-03-20 LAB
BACTERIAL VAGINOSIS DNA: POSITIVE
C TRACH DNA SPEC QL NAA+PROBE: NOT DETECTED
CANDIDA GLABRATA DNA: NEGATIVE
CANDIDA KRUSEI DNA: NEGATIVE
CANDIDA RRNA VAG QL PROBE: POSITIVE
N GONORRHOEA DNA SPEC QL NAA+PROBE: NOT DETECTED
T VAGINALIS RRNA GENITAL QL PROBE: NEGATIVE

## 2024-03-25 ENCOUNTER — HOSPITAL ENCOUNTER (OUTPATIENT)
Dept: RADIOLOGY | Facility: HOSPITAL | Age: 44
Discharge: HOME | End: 2024-03-25
Attending: STUDENT IN AN ORGANIZED HEALTH CARE EDUCATION/TRAINING PROGRAM
Payer: COMMERCIAL

## 2024-03-25 DIAGNOSIS — N94.10 DYSPAREUNIA IN FEMALE: ICD-10-CM

## 2024-03-25 PROCEDURE — 76856 US EXAM PELVIC COMPLETE: CPT | Mod: 26,,, | Performed by: RADIOLOGY

## 2024-03-25 PROCEDURE — 76830 TRANSVAGINAL US NON-OB: CPT | Mod: TC

## 2024-03-25 PROCEDURE — 76830 TRANSVAGINAL US NON-OB: CPT | Mod: 26,,, | Performed by: RADIOLOGY

## 2024-03-25 RX ORDER — FLUCONAZOLE 150 MG/1
150 TABLET ORAL DAILY
Qty: 1 TABLET | Refills: 0 | Status: SHIPPED | OUTPATIENT
Start: 2024-03-25 | End: 2024-03-26

## 2024-03-25 RX ORDER — METRONIDAZOLE 500 MG/1
500 TABLET ORAL EVERY 12 HOURS
Qty: 14 TABLET | Refills: 0 | Status: SHIPPED | OUTPATIENT
Start: 2024-03-25 | End: 2024-04-01